# Patient Record
Sex: FEMALE | Race: WHITE | HISPANIC OR LATINO | Employment: PART TIME | URBAN - METROPOLITAN AREA
[De-identification: names, ages, dates, MRNs, and addresses within clinical notes are randomized per-mention and may not be internally consistent; named-entity substitution may affect disease eponyms.]

---

## 2017-02-11 ENCOUNTER — OFFICE VISIT (OUTPATIENT)
Dept: RETAIL CLINIC | Facility: CLINIC | Age: 47
End: 2017-02-11

## 2017-02-11 VITALS
SYSTOLIC BLOOD PRESSURE: 118 MMHG | HEART RATE: 90 BPM | OXYGEN SATURATION: 98 % | DIASTOLIC BLOOD PRESSURE: 68 MMHG | RESPIRATION RATE: 16 BRPM | TEMPERATURE: 98.4 F

## 2017-02-11 DIAGNOSIS — J01.00 ACUTE MAXILLARY SINUSITIS, RECURRENCE NOT SPECIFIED: Primary | ICD-10-CM

## 2017-02-11 PROBLEM — R68.84 JAW PAIN: Status: ACTIVE | Noted: 2017-02-11

## 2017-02-11 PROBLEM — H92.01 RIGHT EAR PAIN: Status: ACTIVE | Noted: 2017-02-11

## 2017-02-11 PROBLEM — J34.89 SINUS PRESSURE: Status: ACTIVE | Noted: 2017-02-11

## 2017-02-11 PROCEDURE — 99213 OFFICE O/P EST LOW 20 MIN: CPT | Performed by: NURSE PRACTITIONER

## 2017-02-11 RX ORDER — AMOXICILLIN AND CLAVULANATE POTASSIUM 875; 125 MG/1; MG/1
1 TABLET, FILM COATED ORAL 2 TIMES DAILY
Qty: 20 TABLET | Refills: 0 | Status: SHIPPED | OUTPATIENT
Start: 2017-02-11 | End: 2017-02-21

## 2017-02-11 NOTE — PROGRESS NOTES
Subjective   Thea Pope is a 46 y.o. female.     Earache    There is pain in the right ear. This is a new problem. The current episode started yesterday. The problem occurs constantly. The problem has been gradually worsening. There has been no fever. The pain is at a severity of 5/10. The pain is moderate. Associated symptoms include coughing, headaches, rhinorrhea and a sore throat. Pertinent negatives include no abdominal pain, diarrhea, neck pain, rash or vomiting. She has tried NSAIDs and acetaminophen for the symptoms. The treatment provided mild relief.   Sinus Problem   This is a new problem. The current episode started 1 to 4 weeks ago. The problem has been gradually worsening since onset. There has been no fever. Her pain is at a severity of 6/10. The pain is moderate. Associated symptoms include congestion, coughing, ear pain, headaches, sinus pressure, sneezing and a sore throat. Pertinent negatives include no neck pain. Past treatments include oral decongestants, saline sprays and acetaminophen. The treatment provided mild relief.       The following portions of the patient's history were reviewed and updated as appropriate: allergies, current medications, past family history, past medical history, past social history, past surgical history and problem list.    Review of Systems   Constitutional: Positive for activity change, appetite change and fatigue.   HENT: Positive for congestion, ear pain, rhinorrhea, sinus pressure, sneezing and sore throat.    Eyes: Negative for discharge and itching.   Respiratory: Positive for cough. Negative for chest tightness and wheezing.    Cardiovascular: Negative for chest pain and palpitations.   Gastrointestinal: Positive for nausea. Negative for abdominal distention, abdominal pain, constipation, diarrhea and vomiting.   Endocrine: Negative for cold intolerance.   Genitourinary: Negative for difficulty urinating.   Musculoskeletal: Negative for gait problem, neck  pain and neck stiffness.   Skin: Negative for color change, pallor and rash.   Allergic/Immunologic: Positive for environmental allergies.   Neurological: Positive for dizziness and headaches.   Psychiatric/Behavioral: Negative for agitation, behavioral problems and confusion.   All other systems reviewed and are negative.      Objective   Physical Exam   Constitutional: She is oriented to person, place, and time. Vital signs are normal. She appears well-developed and well-nourished.   HENT:   Head: Normocephalic.   Right Ear: External ear and ear canal normal. There is tenderness. No drainage. No foreign bodies. Tympanic membrane is erythematous and retracted. Tympanic membrane mobility is abnormal. Decreased hearing is noted.   Left Ear: Hearing, tympanic membrane, external ear and ear canal normal.   Nose: Rhinorrhea and sinus tenderness present. Right sinus exhibits maxillary sinus tenderness and frontal sinus tenderness. Left sinus exhibits maxillary sinus tenderness and frontal sinus tenderness.   Mouth/Throat: Uvula is midline and mucous membranes are normal. Posterior oropharyngeal erythema present.   Purulent discharge noted   Eyes: Conjunctivae, EOM and lids are normal. Pupils are equal, round, and reactive to light.   Neck: Trachea normal and full passive range of motion without pain.   Cardiovascular: Normal rate, regular rhythm and normal heart sounds.    Pulmonary/Chest: Effort normal and breath sounds normal.   Abdominal: Soft. Normal appearance and bowel sounds are normal. There is no tenderness.   Musculoskeletal: Normal range of motion.   Lymphadenopathy:     She has no cervical adenopathy.   Neurological: She is alert and oriented to person, place, and time. She has normal strength.   Skin: Skin is warm, dry and intact. No rash noted.   Psychiatric: She has a normal mood and affect. Her speech is normal and behavior is normal. Judgment and thought content normal. Cognition and memory are normal.        Assessment/Plan   Thea was seen today for earache and sinus problem.    Diagnoses and all orders for this visit:    Acute maxillary sinusitis, recurrence not specified  -     amoxicillin-clavulanate (AUGMENTIN) 875-125 MG per tablet; Take 1 tablet by mouth 2 (Two) Times a Day for 10 days.     Patient agrees with treatment plan and understands when to return to PCP or seek ER care.  Patient states not experiencing fever with illnesses; however, states experiencing chills twice lately during the past few weeks of symptoms.

## 2017-02-11 NOTE — PATIENT INSTRUCTIONS

## 2017-10-06 ENCOUNTER — LAB (OUTPATIENT)
Dept: OBSTETRICS AND GYNECOLOGY | Facility: CLINIC | Age: 47
End: 2017-10-06

## 2017-10-06 DIAGNOSIS — D50.9 IRON DEFICIENCY ANEMIA, UNSPECIFIED IRON DEFICIENCY ANEMIA TYPE: Primary | ICD-10-CM

## 2017-10-06 LAB
25(OH)D3+25(OH)D2 SERPL-MCNC: 16.1 NG/ML
BASOPHILS # BLD AUTO: 0.04 10*3/MM3 (ref 0–0.2)
BASOPHILS NFR BLD AUTO: 0.6 % (ref 0–2)
CALCIUM SERPL-MCNC: 8.8 MG/DL (ref 8.6–10.5)
EOSINOPHIL # BLD AUTO: 0.07 10*3/MM3 (ref 0.1–0.3)
EOSINOPHIL NFR BLD AUTO: 1 % (ref 0–4)
ERYTHROCYTE [DISTWIDTH] IN BLOOD BY AUTOMATED COUNT: 12.7 % (ref 11.5–14.5)
HCT VFR BLD AUTO: 38.3 % (ref 37–47)
HGB BLD-MCNC: 12.7 G/DL (ref 12–16)
IMM GRANULOCYTES # BLD: 0.02 10*3/MM3 (ref 0–0.03)
IMM GRANULOCYTES NFR BLD: 0.3 % (ref 0–0.5)
LYMPHOCYTES # BLD AUTO: 2.06 10*3/MM3 (ref 0.6–4.8)
LYMPHOCYTES NFR BLD AUTO: 29.3 % (ref 20–45)
MCH RBC QN AUTO: 30.1 PG (ref 27–31)
MCHC RBC AUTO-ENTMCNC: 33.2 G/DL (ref 31–37)
MCV RBC AUTO: 90.8 FL (ref 81–99)
MONOCYTES # BLD AUTO: 0.58 10*3/MM3 (ref 0–1)
MONOCYTES NFR BLD AUTO: 8.3 % (ref 3–8)
NEUTROPHILS # BLD AUTO: 4.26 10*3/MM3 (ref 1.5–8.3)
NEUTROPHILS NFR BLD AUTO: 60.5 % (ref 45–70)
NRBC BLD AUTO-RTO: 0 /100 WBC (ref 0–0)
PLATELET # BLD AUTO: 246 10*3/MM3 (ref 140–500)
RBC # BLD AUTO: 4.22 10*6/MM3 (ref 4.2–5.4)
WBC # BLD AUTO: 7.03 10*3/MM3 (ref 4.8–10.8)

## 2017-11-03 ENCOUNTER — TRANSCRIBE ORDERS (OUTPATIENT)
Dept: ADMINISTRATIVE | Facility: HOSPITAL | Age: 47
End: 2017-11-03

## 2017-11-03 DIAGNOSIS — Z12.39 ENCOUNTER FOR SCREENING BREAST EXAMINATION: Primary | ICD-10-CM

## 2017-12-13 ENCOUNTER — HOSPITAL ENCOUNTER (OUTPATIENT)
Dept: MAMMOGRAPHY | Facility: HOSPITAL | Age: 47
Discharge: HOME | End: 2017-12-13
Attending: OBSTETRICS & GYNECOLOGY | Admitting: OBSTETRICS & GYNECOLOGY

## 2017-12-13 DIAGNOSIS — Z12.39 ENCOUNTER FOR SCREENING BREAST EXAMINATION: ICD-10-CM

## 2017-12-13 PROCEDURE — 77063 BREAST TOMOSYNTHESIS BI: CPT

## 2017-12-13 PROCEDURE — G0202 SCR MAMMO BI INCL CAD: HCPCS

## 2017-12-20 ENCOUNTER — OFFICE VISIT (OUTPATIENT)
Dept: OBSTETRICS AND GYNECOLOGY | Facility: CLINIC | Age: 47
End: 2017-12-20

## 2017-12-20 VITALS
DIASTOLIC BLOOD PRESSURE: 70 MMHG | HEIGHT: 63 IN | WEIGHT: 124 LBS | BODY MASS INDEX: 21.97 KG/M2 | SYSTOLIC BLOOD PRESSURE: 110 MMHG

## 2017-12-20 DIAGNOSIS — N95.1 MENOPAUSAL SYMPTOM: ICD-10-CM

## 2017-12-20 DIAGNOSIS — Z01.419 ENCOUNTER FOR GYNECOLOGICAL EXAMINATION WITHOUT ABNORMAL FINDING: ICD-10-CM

## 2017-12-20 DIAGNOSIS — Z13.9 SCREENING FOR CONDITION: Primary | ICD-10-CM

## 2017-12-20 LAB
BILIRUB BLD-MCNC: NEGATIVE MG/DL
CLARITY, POC: CLEAR
COLOR UR: YELLOW
GLUCOSE UR STRIP-MCNC: NEGATIVE MG/DL
KETONES UR QL: NEGATIVE
LEUKOCYTE EST, POC: NEGATIVE
NITRITE UR-MCNC: NEGATIVE MG/ML
PH UR: 5 [PH] (ref 5–8)
PROT UR STRIP-MCNC: NEGATIVE MG/DL
RBC # UR STRIP: NEGATIVE /UL
SP GR UR: 1 (ref 1–1.03)
UROBILINOGEN UR QL: NORMAL

## 2017-12-20 PROCEDURE — 99396 PREV VISIT EST AGE 40-64: CPT | Performed by: OBSTETRICS & GYNECOLOGY

## 2017-12-20 PROCEDURE — 81002 URINALYSIS NONAUTO W/O SCOPE: CPT | Performed by: OBSTETRICS & GYNECOLOGY

## 2017-12-20 PROCEDURE — 99213 OFFICE O/P EST LOW 20 MIN: CPT | Performed by: OBSTETRICS & GYNECOLOGY

## 2017-12-20 RX ORDER — PAROXETINE 7.5 MG/1
1 CAPSULE ORAL
Qty: 30 CAPSULE | Refills: 3 | Status: SHIPPED | OUTPATIENT
Start: 2017-12-20 | End: 2018-12-26

## 2017-12-20 NOTE — PROGRESS NOTES
GYN Annual Exam     CC- Here for annual exam.     Thea Pope is a 47 y.o. female established patient who presents for annual well woman exam. No pain since TLH.  She is having hot flashes and feels very emotionally labile.  She is also having poor sleep.  No pain with sex.     OB History      Para Term  AB Living    2 2 2   2    SAB TAB Ectopic Multiple Live Births                Obstetric Comments    2 C/S          Menarche: 10  Current contraception: status post hysterectomy  History of abnormal Pap smear: no  History of abnormal mammogram: no  Family history of uterine, colon or ovarian cancer: no  Family history of breast cancer: no  H/o STDs: none  Gardasil:     Health Maintenance   Topic Date Due   • TDAP/TD VACCINES (1 - Tdap) 10/27/1987   • INFLUENZA VACCINE  2017       Past Medical History:   Diagnosis Date   • Allergic     seasonal       Past Surgical History:   Procedure Laterality Date   •  SECTION      x 2   • HYSTERECTOMY      TLH with OC   • PELVIC LAPAROSCOPY  2014    dx lsc, removal of adhesion from cuff         Current Outpatient Prescriptions:   •  PARoxetine Mesylate (BRISDELLE) 7.5 MG capsule, Take 1 tablet by mouth every night at bedtime., Disp: 30 capsule, Rfl: 3    No Known Allergies    Social History   Substance Use Topics   • Smoking status: Never Smoker   • Smokeless tobacco: Never Used   • Alcohol use No       Family History   Problem Relation Age of Onset   • No Known Problems Mother    • No Known Problems Father    • Breast cancer Neg Hx    • Ovarian cancer Neg Hx    • Colon cancer Neg Hx    • Deep vein thrombosis Neg Hx    • Pulmonary embolism Neg Hx        Review of Systems   Constitutional: Negative for appetite change, fatigue, fever and unexpected weight change.   Respiratory: Negative for cough and shortness of breath.    Cardiovascular: Negative for chest pain and palpitations.   Gastrointestinal: Negative for abdominal distention,  "abdominal pain, constipation, diarrhea and nausea.   Endocrine: Positive for heat intolerance.   Genitourinary: Negative for dyspareunia, dysuria, menstrual problem, pelvic pain and vaginal discharge.   Skin: Negative for color change and rash.   Neurological: Negative for headaches.   Psychiatric/Behavioral: Positive for decreased concentration, dysphoric mood and sleep disturbance. The patient is nervous/anxious.        /70  Ht 160 cm (63\")  Wt 56.2 kg (124 lb)  BMI 21.97 kg/m2    Physical Exam   Constitutional: She is oriented to person, place, and time. She appears well-developed and well-nourished.   HENT:   Head: Normocephalic and atraumatic.   Neck: Neck supple. No thyromegaly present.   Cardiovascular: Normal rate, regular rhythm and normal heart sounds.    Pulmonary/Chest: Effort normal and breath sounds normal. Right breast exhibits no inverted nipple, no mass, no nipple discharge, no skin change and no tenderness. Left breast exhibits no inverted nipple, no mass, no nipple discharge, no skin change and no tenderness.   Abdominal: Soft. Bowel sounds are normal. She exhibits no distension and no mass. There is no tenderness. No hernia.   Genitourinary: Pelvic exam was performed with patient supine. There is no rash, tenderness or lesion on the right labia. There is no rash, tenderness or lesion on the left labia. Right adnexum displays no mass, no tenderness and no fullness. Left adnexum displays no mass, no tenderness and no fullness. No erythema, tenderness or bleeding in the vagina. No foreign body in the vagina. No signs of injury around the vagina. No vaginal discharge found.   Genitourinary Comments: Cuff normal   No masses   Neurological: She is oriented to person, place, and time.   Skin: Skin is warm and dry.   Psychiatric: She has a normal mood and affect. Her behavior is normal. Judgment and thought content normal.   Nursing note and vitals reviewed.         Assessment/Plan    1) GYN HM: " normal pap/HPV 12/2016   SBE demonstrated and encouraged.  2) STD screening: declines Condoms encouraged.  3) Contraception: s/p TLH.  4) Family Planning: no plans, encourage folic acid daily  5) Diet and Exercise discussed  6) Smoking Status: non smoker  7) Social: from [place], .  8) MMG- UTD 12/2017 Birads 1.  9)Follow up prn or 1 year  10) Hot flashes and poor sleep. D/w pt all options for treatment including lifestyle changes, HRT and SSRIs and she would like to try Brisdelle. R/B/A of Brsidelle D/w pt . RTO 4- 6 weeks f/u symptoms.        Thea was seen today for gynecologic exam.    Diagnoses and all orders for this visit:    Screening for condition  -     POC Urinalysis Dipstick    Encounter for gynecological examination without abnormal finding    Menopausal symptom    Other orders  -     PARoxetine Mesylate (BRISDELLE) 7.5 MG capsule; Take 1 tablet by mouth every night at bedtime.          Monica Jones MD  12/24/2017  3:02 PM

## 2017-12-24 PROBLEM — N95.1 MENOPAUSAL SYMPTOM: Status: ACTIVE | Noted: 2017-12-24

## 2018-08-07 ENCOUNTER — TRANSCRIBE ORDERS (OUTPATIENT)
Dept: ADMINISTRATIVE | Facility: HOSPITAL | Age: 48
End: 2018-08-07

## 2018-08-07 ENCOUNTER — HOSPITAL ENCOUNTER (OUTPATIENT)
Dept: GENERAL RADIOLOGY | Facility: HOSPITAL | Age: 48
Discharge: HOME | End: 2018-08-07
Attending: INTERNAL MEDICINE | Admitting: INTERNAL MEDICINE

## 2018-08-07 DIAGNOSIS — M79.641 RIGHT HAND PAIN: Primary | ICD-10-CM

## 2018-08-07 DIAGNOSIS — M79.641 RIGHT HAND PAIN: ICD-10-CM

## 2018-08-07 PROCEDURE — 73130 X-RAY EXAM OF HAND: CPT

## 2018-10-15 ENCOUNTER — TRANSCRIBE ORDERS (OUTPATIENT)
Dept: ADMINISTRATIVE | Facility: HOSPITAL | Age: 48
End: 2018-10-15

## 2018-10-15 DIAGNOSIS — Z12.39 SCREENING BREAST EXAMINATION: Primary | ICD-10-CM

## 2018-12-18 ENCOUNTER — APPOINTMENT (OUTPATIENT)
Dept: MAMMOGRAPHY | Facility: HOSPITAL | Age: 48
End: 2018-12-18
Attending: OBSTETRICS & GYNECOLOGY

## 2018-12-20 ENCOUNTER — HOSPITAL ENCOUNTER (OUTPATIENT)
Dept: MAMMOGRAPHY | Facility: HOSPITAL | Age: 48
Discharge: HOME | End: 2018-12-20
Attending: OBSTETRICS & GYNECOLOGY | Admitting: OBSTETRICS & GYNECOLOGY

## 2018-12-20 DIAGNOSIS — Z12.39 SCREENING BREAST EXAMINATION: ICD-10-CM

## 2018-12-20 PROCEDURE — 77067 SCR MAMMO BI INCL CAD: CPT

## 2018-12-26 ENCOUNTER — OFFICE VISIT (OUTPATIENT)
Dept: OBSTETRICS AND GYNECOLOGY | Facility: CLINIC | Age: 48
End: 2018-12-26

## 2018-12-26 VITALS
SYSTOLIC BLOOD PRESSURE: 112 MMHG | WEIGHT: 135.8 LBS | BODY MASS INDEX: 24.99 KG/M2 | HEIGHT: 62 IN | DIASTOLIC BLOOD PRESSURE: 80 MMHG

## 2018-12-26 DIAGNOSIS — Z01.419 WELL WOMAN EXAM WITH ROUTINE GYNECOLOGICAL EXAM: ICD-10-CM

## 2018-12-26 DIAGNOSIS — Z13.9 SCREENING FOR CONDITION: Primary | ICD-10-CM

## 2018-12-26 PROCEDURE — 81002 URINALYSIS NONAUTO W/O SCOPE: CPT | Performed by: OBSTETRICS & GYNECOLOGY

## 2018-12-26 PROCEDURE — 99396 PREV VISIT EST AGE 40-64: CPT | Performed by: OBSTETRICS & GYNECOLOGY

## 2018-12-26 NOTE — PROGRESS NOTES
GYN Annual Exam     CC- Here for annual exam.     Thea Pope is a 48 y.o. female established patient who presents for annual well woman exam. NO VB or issues. She did not ever start Brsidelle for HF as she did not want to stop drinking wine. She is still having some HF but they are not as severe as they were. She is not interested in any treatment at present      OB History      Para Term  AB Living    2 2 2     2    SAB TAB Ectopic Molar Multiple Live Births                       Obstetric Comments    2 C/S          Menarche:10  Menopause:s/p TLH with OC   HRT:none  Current contraception: status post hysterectomy  History of abnormal Pap smear: no  History of abnormal mammogram: no  Family history of uterine, colon or ovarian cancer: no  Family history of breast cancer: no  STD's: none  Last pap smear:16- normal pap/neg HPV      Health Maintenance   Topic Date Due   • ANNUAL PHYSICAL  10/27/1971   • HEPATITIS A VACCINE ADULT (1 of 2) 10/27/1986   • TDAP/TD VACCINES (1 - Tdap) 10/27/1987   • COLONOSCOPY  10/06/2017   • INFLUENZA VACCINE  2018   • ZOSTER VACCINE (1 of 2) 10/27/2018       Past Medical History:   Diagnosis Date   • Allergic     seasonal       Past Surgical History:   Procedure Laterality Date   •  SECTION      x 2   • HYSTERECTOMY      TLH with OC   • PELVIC LAPAROSCOPY  2014    dx lsc, removal of adhesion from cuff       No current outpatient medications on file.    No Known Allergies    Social History     Tobacco Use   • Smoking status: Never Smoker   • Smokeless tobacco: Never Used   Substance Use Topics   • Alcohol use: Yes     Comment: occassional   • Drug use: No       Family History   Problem Relation Age of Onset   • No Known Problems Mother    • No Known Problems Father    • Breast cancer Neg Hx    • Ovarian cancer Neg Hx    • Colon cancer Neg Hx    • Deep vein thrombosis Neg Hx    • Pulmonary embolism Neg Hx        Review of Systems  "  Constitutional: Negative for appetite change, fatigue, fever and unexpected weight change.   Respiratory: Negative for cough and shortness of breath.    Cardiovascular: Negative for chest pain and palpitations.   Gastrointestinal: Negative for abdominal distention, abdominal pain, constipation, diarrhea and nausea.   Endocrine: Positive for heat intolerance.   Genitourinary: Negative for dyspareunia, dysuria, menstrual problem, pelvic pain, vaginal bleeding, vaginal discharge and vaginal pain.   Skin: Negative for color change and rash.   Neurological: Negative for headaches.   Psychiatric/Behavioral: Negative for dysphoric mood. The patient is not nervous/anxious.        /80   Ht 157.5 cm (62\")   Wt 61.6 kg (135 lb 12.8 oz)   BMI 24.84 kg/m²     Physical Exam   Constitutional: She is oriented to person, place, and time. She appears well-developed and well-nourished.   HENT:   Head: Normocephalic and atraumatic.   Eyes: Conjunctivae are normal. No scleral icterus.   Neck: Neck supple. No thyromegaly present.   Cardiovascular: Normal rate and regular rhythm.   Pulmonary/Chest: Effort normal and breath sounds normal. Right breast exhibits no inverted nipple, no mass, no nipple discharge, no skin change and no tenderness. Left breast exhibits no inverted nipple, no mass, no nipple discharge, no skin change and no tenderness.   Abdominal: Soft. Bowel sounds are normal. She exhibits no distension and no mass. There is no tenderness. There is no rebound and no guarding. No hernia.   Genitourinary: Pelvic exam was performed with patient supine. There is no rash, tenderness or lesion on the right labia. There is no rash, tenderness or lesion on the left labia. Right adnexum displays no mass, no tenderness and no fullness. Left adnexum displays no mass, no tenderness and no fullness. No erythema, tenderness or bleeding in the vagina. No foreign body in the vagina. No signs of injury around the vagina. No vaginal " discharge found.   Genitourinary Comments: Grade 1 cystocele   Neurological: She is alert and oriented to person, place, and time.   Skin: Skin is warm and dry.   Psychiatric: She has a normal mood and affect. Her behavior is normal. Judgment and thought content normal.   Nursing note and vitals reviewed.         Assessment/Plan    1) GYN HM: check pap/HPV   SBE demonstrated and encouraged.  2) STD screening: declines Condoms encouraged.  3) Bone health - Weight bearing exercise, dietary calcium recommendations and vitamin D reviewed.   4) Diet and Exercise discussed  5) Smoking Status: No   6) Social: , 2 kids, no issues  7)MMG:  UTD 12/20/18- B1  8) DEXA- plan baseline age 53  9)C scope-refer.   10) Grade 1 cystocele- pt asymptomatic- info on Kejulia's noted.   11) Schedule fasting WWP.    Follow up prn and 1 year       Thea was seen today for gynecologic exam.    Diagnoses and all orders for this visit:    Screening for condition  -     POC Urinalysis Dipstick    Well woman exam with routine gynecological exam  -     Pap IG, HPV-hr    Other orders  -     Cancel: Pap IG, HPV-hr        Monica Jones MD  12/26/2018  12:39 PM

## 2018-12-28 LAB
CYTOLOGIST CVX/VAG CYTO: NORMAL
CYTOLOGY CVX/VAG DOC THIN PREP: NORMAL
DX ICD CODE: NORMAL
HIV 1 & 2 AB SER-IMP: NORMAL
HPV I/H RISK 1 DNA CVX QL PROBE+SIG AMP: NEGATIVE
OTHER STN SPEC: NORMAL
PATH REPORT.FINAL DX SPEC: NORMAL
PATHOLOGIST CVX/VAG CYTO: NORMAL
STAT OF ADQ CVX/VAG CYTO-IMP: NORMAL

## 2018-12-31 ENCOUNTER — LAB (OUTPATIENT)
Dept: OBSTETRICS AND GYNECOLOGY | Facility: CLINIC | Age: 48
End: 2018-12-31

## 2018-12-31 DIAGNOSIS — Z00.00 WELL WOMAN EXAM WITHOUT GYNECOLOGICAL EXAM: Primary | ICD-10-CM

## 2019-01-01 LAB
25(OH)D3+25(OH)D2 SERPL-MCNC: 35.2 NG/ML (ref 30–100)
ALBUMIN SERPL-MCNC: 4.5 G/DL (ref 3.5–5.5)
ALBUMIN/GLOB SERPL: 2.8 {RATIO} (ref 1.2–2.2)
ALP SERPL-CCNC: 71 IU/L (ref 39–117)
ALT SERPL-CCNC: 15 IU/L (ref 0–32)
AST SERPL-CCNC: 13 IU/L (ref 0–40)
BASOPHILS # BLD AUTO: 0 X10E3/UL (ref 0–0.2)
BASOPHILS NFR BLD AUTO: 0 %
BILIRUB SERPL-MCNC: 0.7 MG/DL (ref 0–1.2)
BUN SERPL-MCNC: 16 MG/DL (ref 6–24)
BUN/CREAT SERPL: 21 (ref 9–23)
CALCIUM SERPL-MCNC: 9.2 MG/DL (ref 8.7–10.2)
CHLORIDE SERPL-SCNC: 106 MMOL/L (ref 96–106)
CHOLEST SERPL-MCNC: 182 MG/DL (ref 100–199)
CO2 SERPL-SCNC: 22 MMOL/L (ref 20–29)
CREAT SERPL-MCNC: 0.75 MG/DL (ref 0.57–1)
EOSINOPHIL # BLD AUTO: 0.1 X10E3/UL (ref 0–0.4)
EOSINOPHIL NFR BLD AUTO: 1 %
ERYTHROCYTE [DISTWIDTH] IN BLOOD BY AUTOMATED COUNT: 13.7 % (ref 12.3–15.4)
GLOBULIN SER CALC-MCNC: 1.6 G/DL (ref 1.5–4.5)
GLUCOSE SERPL-MCNC: 92 MG/DL (ref 65–99)
HCT VFR BLD AUTO: 37 % (ref 34–46.6)
HDLC SERPL-MCNC: 67 MG/DL
HGB BLD-MCNC: 12.2 G/DL (ref 11.1–15.9)
IMM GRANULOCYTES # BLD: 0 X10E3/UL (ref 0–0.1)
IMM GRANULOCYTES NFR BLD: 0 %
LDLC SERPL CALC-MCNC: 101 MG/DL (ref 0–99)
LYMPHOCYTES # BLD AUTO: 2.4 X10E3/UL (ref 0.7–3.1)
LYMPHOCYTES NFR BLD AUTO: 27 %
MCH RBC QN AUTO: 29.9 PG (ref 26.6–33)
MCHC RBC AUTO-ENTMCNC: 33 G/DL (ref 31.5–35.7)
MCV RBC AUTO: 91 FL (ref 79–97)
MONOCYTES # BLD AUTO: 0.7 X10E3/UL (ref 0.1–0.9)
MONOCYTES NFR BLD AUTO: 8 %
NEUTROPHILS # BLD AUTO: 5.5 X10E3/UL (ref 1.4–7)
NEUTROPHILS NFR BLD AUTO: 64 %
PLATELET # BLD AUTO: 261 X10E3/UL (ref 150–379)
POTASSIUM SERPL-SCNC: 4.8 MMOL/L (ref 3.5–5.2)
PROT SERPL-MCNC: 6.1 G/DL (ref 6–8.5)
RBC # BLD AUTO: 4.08 X10E6/UL (ref 3.77–5.28)
SODIUM SERPL-SCNC: 143 MMOL/L (ref 134–144)
TRIGL SERPL-MCNC: 68 MG/DL (ref 0–149)
TSH SERPL DL<=0.005 MIU/L-ACNC: 1.5 UIU/ML (ref 0.45–4.5)
VLDLC SERPL CALC-MCNC: 14 MG/DL (ref 5–40)
WBC # BLD AUTO: 8.7 X10E3/UL (ref 3.4–10.8)

## 2019-06-12 ENCOUNTER — TELEPHONE (OUTPATIENT)
Dept: GASTROENTEROLOGY | Facility: CLINIC | Age: 49
End: 2019-06-12

## 2019-07-05 DIAGNOSIS — Z12.11 SCREENING FOR COLON CANCER: Primary | ICD-10-CM

## 2019-09-30 ENCOUNTER — OUTSIDE FACILITY SERVICE (OUTPATIENT)
Dept: GASTROENTEROLOGY | Facility: CLINIC | Age: 49
End: 2019-09-30

## 2019-09-30 ENCOUNTER — LAB REQUISITION (OUTPATIENT)
Dept: LAB | Facility: HOSPITAL | Age: 49
End: 2019-09-30

## 2019-09-30 DIAGNOSIS — Z12.11 ENCOUNTER FOR SCREENING FOR MALIGNANT NEOPLASM OF COLON: ICD-10-CM

## 2019-09-30 PROCEDURE — 88305 TISSUE EXAM BY PATHOLOGIST: CPT | Performed by: INTERNAL MEDICINE

## 2019-09-30 PROCEDURE — 45380 COLONOSCOPY AND BIOPSY: CPT | Performed by: INTERNAL MEDICINE

## 2019-10-01 LAB
CYTO UR: NORMAL
LAB AP CASE REPORT: NORMAL
LAB AP CLINICAL INFORMATION: NORMAL
PATH REPORT.FINAL DX SPEC: NORMAL
PATH REPORT.GROSS SPEC: NORMAL

## 2019-10-03 ENCOUNTER — TELEPHONE (OUTPATIENT)
Dept: GASTROENTEROLOGY | Facility: CLINIC | Age: 49
End: 2019-10-03

## 2019-10-03 NOTE — TELEPHONE ENCOUNTER
SPOKE WITH PATIENT.  CALLING ON TEST RESULTS FROM HER COLONOSCOPY.  EXPLAINED DR. LEPE NOTE.  REPEAT IN 3 YEARS.  SHE UNDERSTANDS.

## 2019-10-04 ENCOUNTER — TRANSCRIBE ORDERS (OUTPATIENT)
Dept: OBSTETRICS AND GYNECOLOGY | Facility: CLINIC | Age: 49
End: 2019-10-04

## 2019-10-04 DIAGNOSIS — Z12.31 SCREENING MAMMOGRAM, ENCOUNTER FOR: Primary | ICD-10-CM

## 2019-12-23 ENCOUNTER — HOSPITAL ENCOUNTER (OUTPATIENT)
Dept: MAMMOGRAPHY | Facility: HOSPITAL | Age: 49
Discharge: HOME | End: 2019-12-23
Admitting: OBSTETRICS & GYNECOLOGY

## 2019-12-23 DIAGNOSIS — Z12.31 SCREENING MAMMOGRAM, ENCOUNTER FOR: ICD-10-CM

## 2019-12-23 DIAGNOSIS — R92.8 ABNORMAL MAMMOGRAM: Primary | ICD-10-CM

## 2019-12-23 PROCEDURE — 77063 BREAST TOMOSYNTHESIS BI: CPT

## 2019-12-23 PROCEDURE — 77067 SCR MAMMO BI INCL CAD: CPT

## 2019-12-30 ENCOUNTER — HOSPITAL ENCOUNTER (OUTPATIENT)
Dept: MAMMOGRAPHY | Facility: HOSPITAL | Age: 49
Discharge: HOME | End: 2019-12-30
Admitting: OBSTETRICS & GYNECOLOGY

## 2019-12-30 ENCOUNTER — APPOINTMENT (OUTPATIENT)
Dept: ULTRASOUND IMAGING | Facility: HOSPITAL | Age: 49
End: 2019-12-30

## 2019-12-30 DIAGNOSIS — R92.8 ABNORMAL MAMMOGRAM: ICD-10-CM

## 2019-12-30 PROCEDURE — G0279 TOMOSYNTHESIS, MAMMO: HCPCS

## 2019-12-30 PROCEDURE — 77065 DX MAMMO INCL CAD UNI: CPT

## 2020-01-08 ENCOUNTER — OFFICE VISIT (OUTPATIENT)
Dept: OBSTETRICS AND GYNECOLOGY | Facility: CLINIC | Age: 50
End: 2020-01-08

## 2020-01-08 VITALS
HEIGHT: 63 IN | WEIGHT: 138 LBS | BODY MASS INDEX: 24.45 KG/M2 | DIASTOLIC BLOOD PRESSURE: 70 MMHG | SYSTOLIC BLOOD PRESSURE: 110 MMHG

## 2020-01-08 DIAGNOSIS — Z01.419 WELL WOMAN EXAM: Primary | ICD-10-CM

## 2020-01-08 DIAGNOSIS — Z01.419 PAP SMEAR, LOW-RISK: ICD-10-CM

## 2020-01-08 DIAGNOSIS — Z11.51 ENCOUNTER FOR SCREENING FOR HUMAN PAPILLOMAVIRUS (HPV): ICD-10-CM

## 2020-01-08 DIAGNOSIS — N95.1 MENOPAUSAL SYMPTOM: ICD-10-CM

## 2020-01-08 PROCEDURE — 99396 PREV VISIT EST AGE 40-64: CPT | Performed by: OBSTETRICS & GYNECOLOGY

## 2020-01-08 PROCEDURE — 81002 URINALYSIS NONAUTO W/O SCOPE: CPT | Performed by: OBSTETRICS & GYNECOLOGY

## 2020-01-08 NOTE — PROGRESS NOTES
GYN Annual Exam     CC- Here for annual exam.     Thea Pope is a 49 y.o. female established patient who presents for annual well woman exam. NO VB or issues. Had a rash on her L breast and saw derm and had a biopsy was normal. Still with some HF, does not want Brisdelle yet.    OB History        2    Para   2    Term   2            AB        Living   2       SAB        TAB        Ectopic        Molar        Multiple        Live Births              Obstetric Comments   2 C/S             Menarche:10  Menopause:s/p TLH with OC   HRT:none  Current contraception: status post hysterectomy  History of abnormal Pap smear: no  History of abnormal mammogram: no  Family history of uterine, colon or ovarian cancer: no  Family history of breast cancer: no  STD's: none  Last pap smear:2018- normal pap/neg HPV  MOOKIE: none      Health Maintenance   Topic Date Due   • ANNUAL PHYSICAL  10/27/1971   • TDAP/TD VACCINES (1 - Tdap) 10/27/1979   • ZOSTER VACCINE (1 of 2) 10/27/2018   • Annual Gynecologic Pelvic and Breast Exam  2019   • MAMMOGRAM  2021   • COLONOSCOPY  2022   • INFLUENZA VACCINE  Completed       Past Medical History:   Diagnosis Date   • Allergic     seasonal   • Colon polyp        Past Surgical History:   Procedure Laterality Date   •  SECTION      x 2   • COLONOSCOPY     • HYSTERECTOMY      TLH with OC   • PELVIC LAPAROSCOPY  2014    dx lsc, removal of adhesion from cuff       No current outpatient medications on file.    No Known Allergies    Social History     Tobacco Use   • Smoking status: Never Smoker   • Smokeless tobacco: Never Used   Substance Use Topics   • Alcohol use: Yes     Comment: occassional   • Drug use: No       Family History   Problem Relation Age of Onset   • No Known Problems Mother    • No Known Problems Father    • Breast cancer Neg Hx    • Ovarian cancer Neg Hx    • Colon cancer Neg Hx    • Deep vein thrombosis Neg Hx    • Pulmonary  "embolism Neg Hx        Review of Systems   Constitutional: Negative for appetite change, fatigue, fever and unexpected weight change.   Respiratory: Negative for cough and shortness of breath.    Cardiovascular: Negative for chest pain and palpitations.   Gastrointestinal: Negative for abdominal distention, abdominal pain, constipation, diarrhea and nausea.   Endocrine: Positive for heat intolerance.   Genitourinary: Negative for dyspareunia, dysuria, menstrual problem, pelvic pain, vaginal bleeding, vaginal discharge and vaginal pain.   Skin: Negative for color change and rash.   Neurological: Negative for headaches.   Psychiatric/Behavioral: Positive for sleep disturbance. Negative for dysphoric mood. The patient is not nervous/anxious.        /70   Ht 160 cm (63\")   Wt 62.6 kg (138 lb)   Breastfeeding No   BMI 24.45 kg/m²     Physical Exam   Constitutional: She is oriented to person, place, and time. She appears well-developed and well-nourished.   HENT:   Head: Normocephalic and atraumatic.   Eyes: Conjunctivae are normal. No scleral icterus.   Neck: Neck supple. No thyromegaly present.   Cardiovascular: Normal rate and regular rhythm.   Pulmonary/Chest: Effort normal and breath sounds normal. Right breast exhibits no inverted nipple, no mass, no nipple discharge, no skin change and no tenderness. Left breast exhibits no inverted nipple, no mass, no nipple discharge, no skin change and no tenderness.   Abdominal: Soft. Bowel sounds are normal. She exhibits no distension and no mass. There is no tenderness. There is no rebound and no guarding. No hernia.   Genitourinary: Pelvic exam was performed with patient supine. There is no rash, tenderness or lesion on the right labia. There is no rash, tenderness or lesion on the left labia. Right adnexum displays no mass, no tenderness and no fullness. Left adnexum displays no mass, no tenderness and no fullness. No erythema, tenderness or bleeding in the " vagina. No foreign body in the vagina. No signs of injury around the vagina. No vaginal discharge found.   Genitourinary Comments: Grade 1 cystocele   Neurological: She is alert and oriented to person, place, and time.   Skin: Skin is warm and dry.   Psychiatric: She has a normal mood and affect. Her behavior is normal. Judgment and thought content normal.   Nursing note and vitals reviewed.         Assessment/Plan    1) GYN HM: check pap/HPV   SBE demonstrated and encouraged.  2) STD screening: declines Condoms encouraged.  3) Bone health - Weight bearing exercise, dietary calcium recommendations and vitamin D reviewed.   4) Diet and Exercise discussed  5) Smoking Status: No   6) HF- try OTC like Estroven, decline all other meds  7)MMG:  UTD 12/2019- B1  8) DEXA- plan baseline age 53  9)C scope-UTD 9/2019, repeat 3 years  10) Grade 1 cystocele- pt asymptomatic- info on Kegle's noted.   11) Parts of this document have been copied or forwarded from her previous visits and have been reviewed, updated and edited as indicated. .   12) Follow up prn and 1 year       Thea was seen today for gynecologic exam.    Diagnoses and all orders for this visit:    Well woman exam  -     POC Urinalysis Dipstick  -     Pap IG, HPV-hr    Pap smear, low-risk  -     Pap IG, HPV-hr    Encounter for screening for human papillomavirus (HPV)  -     Pap IG, HPV-hr        Monica Jones MD  1/8/2020  11:13 AM

## 2020-01-10 LAB
CYTOLOGIST CVX/VAG CYTO: NORMAL
CYTOLOGY CVX/VAG DOC CYTO: NORMAL
CYTOLOGY CVX/VAG DOC THIN PREP: NORMAL
DX ICD CODE: NORMAL
HIV 1 & 2 AB SER-IMP: NORMAL
HPV I/H RISK 1 DNA CVX QL PROBE+SIG AMP: NORMAL
HPV I/H RISK 4 DNA CVX QL PROBE+SIG AMP: NEGATIVE
OTHER STN SPEC: NORMAL
STAT OF ADQ CVX/VAG CYTO-IMP: NORMAL

## 2020-12-08 ENCOUNTER — TRANSCRIBE ORDERS (OUTPATIENT)
Dept: ADMINISTRATIVE | Facility: HOSPITAL | Age: 50
End: 2020-12-08

## 2020-12-08 DIAGNOSIS — Z12.31 SCREENING MAMMOGRAM, ENCOUNTER FOR: Primary | ICD-10-CM

## 2021-01-11 ENCOUNTER — OFFICE VISIT (OUTPATIENT)
Dept: INTERNAL MEDICINE | Facility: CLINIC | Age: 51
End: 2021-01-11

## 2021-01-11 VITALS
RESPIRATION RATE: 16 BRPM | BODY MASS INDEX: 22.66 KG/M2 | OXYGEN SATURATION: 98 % | WEIGHT: 141 LBS | HEIGHT: 66 IN | HEART RATE: 87 BPM | DIASTOLIC BLOOD PRESSURE: 76 MMHG | SYSTOLIC BLOOD PRESSURE: 118 MMHG | TEMPERATURE: 96.8 F

## 2021-01-11 DIAGNOSIS — E03.9 ACQUIRED HYPOTHYROIDISM: Primary | ICD-10-CM

## 2021-01-11 DIAGNOSIS — Z83.49: ICD-10-CM

## 2021-01-11 PROBLEM — H92.01 RIGHT EAR PAIN: Status: RESOLVED | Noted: 2017-02-11 | Resolved: 2021-01-11

## 2021-01-11 PROBLEM — E55.9 VITAMIN D DEFICIENCY: Chronic | Status: ACTIVE | Noted: 2021-01-11

## 2021-01-11 PROBLEM — J34.89 SINUS PRESSURE: Status: RESOLVED | Noted: 2017-02-11 | Resolved: 2021-01-11

## 2021-01-11 PROBLEM — R68.84 JAW PAIN: Status: RESOLVED | Noted: 2017-02-11 | Resolved: 2021-01-11

## 2021-01-11 PROBLEM — E53.8 VITAMIN B12 DEFICIENCY: Chronic | Status: ACTIVE | Noted: 2021-01-11

## 2021-01-11 PROCEDURE — 99214 OFFICE O/P EST MOD 30 MIN: CPT | Performed by: INTERNAL MEDICINE

## 2021-01-11 RX ORDER — LEVOTHYROXINE SODIUM 0.07 MG/1
TABLET ORAL
COMMUNITY
Start: 2020-10-30 | End: 2021-01-28 | Stop reason: SDUPTHER

## 2021-01-11 NOTE — PROGRESS NOTES
"Chief Complaint  Thyroid Problem (pt here to discuss referral to Endo)    Subjective          Thea Pope presents to [facility] for discussion about endo referral. Mom was recently diagnosed with acromegaly, interested in seeing endo bc she is worried she is developing concerning symptoms. Ring size has gone up 3 sizes, shoe size has changed 2-3 sizes over past years.     Objective   Vital Signs:     /76   Pulse 87   Temp 96.8 °F (36 °C)   Resp 16   Ht 167.6 cm (66\")   Wt 64 kg (141 lb)   SpO2 98%   BMI 22.76 kg/m²     Physical Exam  Constitutional:       General: She is not in acute distress.     Appearance: Normal appearance.      Comments: No frontal bossing apparent     HENT:      Head: Normocephalic and atraumatic.      Comments: No apparent frontal bossing       Right Ear: External ear normal.      Left Ear: External ear normal.      Nose: Nose normal.      Mouth/Throat:      Pharynx: Oropharynx is clear. No oropharyngeal exudate.   Eyes:      Extraocular Movements: Extraocular movements intact.      Conjunctiva/sclera: Conjunctivae normal.      Pupils: Pupils are equal, round, and reactive to light.   Neck:      Musculoskeletal: Normal range of motion and neck supple.      Thyroid: No thyroid mass or thyromegaly.   Cardiovascular:      Rate and Rhythm: Normal rate and regular rhythm.      Pulses: Normal pulses.      Heart sounds: No murmur.   Pulmonary:      Effort: Pulmonary effort is normal. No respiratory distress.      Breath sounds: Normal breath sounds.   Abdominal:      General: Abdomen is flat. Bowel sounds are normal.   Musculoskeletal:      Comments: Nodules present on several DIP joints of hands and feet, no other notable joint or bone abnormalities   Neurological:      Mental Status: She is alert.          Result Review :   The following data was reviewed by: Estephania Solorzano MD on 01/11/2021:    Labs: TSH in PF from 9/2020      "      Assessment and Plan      1. Concern for Acromegaly  - pt with positive FHx and many similar presenting symptoms including growth of hands and feet with changing ring and shoe size, gloves also becoming tight. Pt was recently diagnosed with hypothyroidism 6/2020 and has been started on thyroid replacement at that time, but could be central in nature and contributing to an overall pituitary abn, but at time of diagnosis the TSH was largely elevated at >30 which would make this less likely. Did have abn colonoscopy with numerous precancerous polyps and a history of insulin resistant state with gestational diabetes, so hard to say if all coincidental or if supportive of above diagnosis. Will get labs today including IGF-1 for first step in diagnostic algorithm and then determine need for endo involvement from there based on results. Pt aware of plan and is awaiting call regarding lab results once available to plan next step.     I spent 30 minutes caring for Thea on this date of service. This time includes time spent by me in the following activities:preparing for the visit, reviewing tests, obtaining and/or reviewing a separately obtained history, performing a medically appropriate examination and/or evaluation , counseling and educating the patient/family/caregiver, ordering medications, tests, or procedures and documenting information in the medical record    Follow Up   Return for after labs have returned.    Patient was given instructions and counseling regarding her condition or for health maintenance advice. Please see specific information pulled into the AVS if appropriate.     Mihcelle Solorzano MD  [facility]  Phone: [phone]  Fax: [phone]

## 2021-01-14 ENCOUNTER — HOSPITAL ENCOUNTER (OUTPATIENT)
Dept: MAMMOGRAPHY | Facility: HOSPITAL | Age: 51
Discharge: HOME | End: 2021-01-14
Admitting: OBSTETRICS & GYNECOLOGY

## 2021-01-14 DIAGNOSIS — Z12.31 SCREENING MAMMOGRAM, ENCOUNTER FOR: ICD-10-CM

## 2021-01-14 PROCEDURE — 77067 SCR MAMMO BI INCL CAD: CPT

## 2021-01-14 PROCEDURE — 77063 BREAST TOMOSYNTHESIS BI: CPT

## 2021-01-19 ENCOUNTER — APPOINTMENT (OUTPATIENT)
Dept: MAMMOGRAPHY | Facility: HOSPITAL | Age: 51
End: 2021-01-19

## 2021-01-19 ENCOUNTER — TELEPHONE (OUTPATIENT)
Dept: INTERNAL MEDICINE | Facility: CLINIC | Age: 51
End: 2021-01-19

## 2021-01-19 LAB
ALBUMIN SERPL-MCNC: 4.3 G/DL (ref 3.5–5.2)
ALBUMIN/GLOB SERPL: 2.2 G/DL
ALP SERPL-CCNC: 89 U/L (ref 39–117)
ALT SERPL-CCNC: 23 U/L (ref 1–33)
AST SERPL-CCNC: 20 U/L (ref 1–32)
BILIRUB SERPL-MCNC: 0.4 MG/DL (ref 0–1.2)
BUN SERPL-MCNC: 13 MG/DL (ref 6–20)
BUN/CREAT SERPL: 21 (ref 7–25)
CALCIUM SERPL-MCNC: 9.2 MG/DL (ref 8.6–10.5)
CHLORIDE SERPL-SCNC: 105 MMOL/L (ref 98–107)
CO2 SERPL-SCNC: 26.7 MMOL/L (ref 22–29)
CREAT SERPL-MCNC: 0.62 MG/DL (ref 0.57–1)
GLOBULIN SER CALC-MCNC: 2 GM/DL
GLUCOSE SERPL-MCNC: 91 MG/DL (ref 65–99)
HBA1C MFR BLD: 5.7 % (ref 4.8–5.6)
IGF-I SERPL-MCNC: NORMAL NG/ML
POTASSIUM SERPL-SCNC: 4.4 MMOL/L (ref 3.5–5.2)
PROT SERPL-MCNC: 6.3 G/DL (ref 6–8.5)
REQUEST PROBLEM: NORMAL
SODIUM SERPL-SCNC: 140 MMOL/L (ref 136–145)
SPECIMEN STATUS: NORMAL
T4 FREE SERPL-MCNC: 1.26 NG/DL (ref 0.93–1.7)
TSH SERPL DL<=0.005 MIU/L-ACNC: 1.23 UIU/ML (ref 0.27–4.2)

## 2021-01-19 NOTE — TELEPHONE ENCOUNTER
"hmmm well that's odd bc you can clearly see in the original order that it is there and is now listed as an \"expected test\", does she have an appt to come back for labs?  "

## 2021-01-19 NOTE — TELEPHONE ENCOUNTER
Caller: Thea Pope    Relationship: Self    Best call back number: [phone]    Caller requesting test results: SELF    What test was performed: LABS    When was the test performed: 1-11-21    Where was the test performed: OFFICE    Additional notes: PATIENT IS NEEDING THE RESULTS FROM HER LABS

## 2021-01-20 ENCOUNTER — TELEPHONE (OUTPATIENT)
Dept: INTERNAL MEDICINE | Facility: CLINIC | Age: 51
End: 2021-01-20

## 2021-01-20 DIAGNOSIS — Z83.49: Primary | ICD-10-CM

## 2021-01-20 NOTE — TELEPHONE ENCOUNTER
Patient called and requested a lab order be put in the system.  She said that she was here on 1/11/21 for lab work, but apparently everything wasn't done.  She's going to get it at [facility] on 1/26/21.    Thank you

## 2021-01-20 NOTE — TELEPHONE ENCOUNTER
Order is in, still not entirely sure what happened last time bc it just says the order was cancelled but it was not, so a new order is placed so she can have that drawn whenever available

## 2021-01-28 DIAGNOSIS — Z83.49: Primary | ICD-10-CM

## 2021-01-28 RX ORDER — LEVOTHYROXINE SODIUM 0.07 MG/1
75 TABLET ORAL DAILY
Qty: 90 TABLET | Refills: 1 | Status: SHIPPED | OUTPATIENT
Start: 2021-01-28 | End: 2021-06-21

## 2021-03-01 ENCOUNTER — OFFICE VISIT (OUTPATIENT)
Dept: OBSTETRICS AND GYNECOLOGY | Facility: CLINIC | Age: 51
End: 2021-03-01

## 2021-03-01 VITALS
HEIGHT: 62 IN | DIASTOLIC BLOOD PRESSURE: 60 MMHG | SYSTOLIC BLOOD PRESSURE: 112 MMHG | BODY MASS INDEX: 26.17 KG/M2 | WEIGHT: 142.2 LBS

## 2021-03-01 DIAGNOSIS — Z01.411 ENCOUNTER FOR GYNECOLOGICAL EXAMINATION WITH ABNORMAL FINDING: ICD-10-CM

## 2021-03-01 DIAGNOSIS — Z13.9 SCREENING FOR CONDITION: Primary | ICD-10-CM

## 2021-03-01 DIAGNOSIS — N95.2 VAGINAL ATROPHY: ICD-10-CM

## 2021-03-01 DIAGNOSIS — N95.1 MENOPAUSAL SYMPTOM: ICD-10-CM

## 2021-03-01 PROCEDURE — 99396 PREV VISIT EST AGE 40-64: CPT | Performed by: OBSTETRICS & GYNECOLOGY

## 2021-03-01 PROCEDURE — 99213 OFFICE O/P EST LOW 20 MIN: CPT | Performed by: OBSTETRICS & GYNECOLOGY

## 2021-03-01 PROCEDURE — 81002 URINALYSIS NONAUTO W/O SCOPE: CPT | Performed by: OBSTETRICS & GYNECOLOGY

## 2021-03-01 RX ORDER — ESTRADIOL 0.1 MG/G
1 CREAM VAGINAL 2 TIMES WEEKLY
Qty: 45 G | Refills: 6 | Status: SHIPPED | OUTPATIENT
Start: 2021-03-01 | End: 2022-11-02

## 2021-03-01 NOTE — PROGRESS NOTES
GYN Annual Exam     CC- Here for annual exam.     Thea Pope is a 50 y.o. female established patient who presents for annual well woman exam. NO VB . She is having some dryness and discomfort with sex. She is also having severe hot flashes. She used Estroven OTC and it worked well until the past 6 months. She is waking up and night and having sweating through her clothes. She has thyroid disease and is on medication. We discussed lifestyle changes, SSRI and HRT to help and she wants to try some lifestyle changes first. She is drinking more now during the pandemic ( usually at least 2 glasses of wine a night, but yesterday she had 2 glasses of wine and 2 beers). I advised her to limit herself to one drink a day and add back Estroven and we will check again in 4-6 weeks to see how her symptoms are doing.     OB History        2    Para   2    Term   2            AB        Living   2       SAB        TAB        Ectopic        Molar        Multiple        Live Births              Obstetric Comments   2 C/S             Menarche:10  Menopause:s/p TLH with OC   HRT:none  Current contraception: status post hysterectomy  History of abnormal Pap smear: no  History of abnormal mammogram: no  Family history of uterine, colon or ovarian cancer: ? MGM colon cancer  Family history of breast cancer: no  STD's: none  Last pap smear:2020 normal pap/neg HPV  MOOKIE: none      Health Maintenance   Topic Date Due   • ANNUAL PHYSICAL  10/27/1973   • HEPATITIS C SCREENING  10/06/2017   • ZOSTER VACCINE (1 of 2) 10/27/2018   • Annual Gynecologic Pelvic and Breast Exam  2021   • COLONOSCOPY  2022   • MAMMOGRAM  2023   • TDAP/TD VACCINES (2 - Td) 2030   • INFLUENZA VACCINE  Completed   • Pneumococcal Vaccine 0-64  Aged Out   • MENINGOCOCCAL VACCINE  Aged Out       Past Medical History:   Diagnosis Date   • Acute upper respiratory infection, unspecified    • Allergic     seasonal   • Candidiasis      OF OTHER UROGENITAL SITES   • Colon polyp    • Cough    • Deficiency of other specified B group vitamins    • Hypothyroidism    • Pain in limb    • Pain in unspecified joint    • Plantar fasciitis    • Unspecified acute conjunctivitis, left eye    • Unspecified menopausal and perimenopausal disorder    • Vitamin B12 deficiency 2021   • Vitamin D deficiency 2021   • Vitamin D deficiency, unspecified        Past Surgical History:   Procedure Laterality Date   •  SECTION      x 2   • COLONOSCOPY      2019, POLYPECTOMY WITH PRECANCEROUS CELLS, REPEAT Q3Y, DUE    • HYSTERECTOMY  2014    TLH with OC   • PELVIC LAPAROSCOPY  2014    dx lsc, removal of adhesion from cuff         Current Outpatient Medications:   •  estradiol (ESTRACE) 0.1 MG/GM vaginal cream, Insert 1 g into the vagina 2 (Two) Times a Week., Disp: 45 g, Rfl: 6  •  levothyroxine (Euthyrox) 75 MCG tablet, Take 1 tablet by mouth Daily., Disp: 90 tablet, Rfl: 1    No Known Allergies    Social History     Tobacco Use   • Smoking status: Never Smoker   • Smokeless tobacco: Never Used   Substance Use Topics   • Alcohol use: Yes     Frequency: 2-4 times a month     Drinks per session: 1 or 2     Binge frequency: Never   • Drug use: No       Family History   Problem Relation Age of Onset   • Diabetes Mother         PREDIABETES   • Acromegaly Mother    • Hyperlipidemia Father    • Colon cancer Maternal Grandmother    • Breast cancer Neg Hx    • Ovarian cancer Neg Hx    • Deep vein thrombosis Neg Hx    • Pulmonary embolism Neg Hx        Review of Systems   Constitutional: Positive for activity change. Negative for appetite change, fatigue, fever and unexpected weight change.   Respiratory: Negative for cough and shortness of breath.    Cardiovascular: Negative for chest pain and palpitations.   Gastrointestinal: Negative for abdominal distention, abdominal pain, constipation, diarrhea and nausea.   Endocrine: Positive for heat intolerance.  "  Genitourinary: Positive for dyspareunia and vaginal pain. Negative for dysuria, menstrual problem, pelvic pain, vaginal bleeding and vaginal discharge.   Skin: Negative for color change and rash.   Neurological: Negative for headaches.   Psychiatric/Behavioral: Positive for sleep disturbance. Negative for dysphoric mood. The patient is not nervous/anxious.        /60   Ht 157.5 cm (62\")   Wt 64.5 kg (142 lb 3.2 oz)   Breastfeeding No   BMI 26.01 kg/m²     Physical Exam   Constitutional: She is oriented to person, place, and time. She appears well-developed.   HENT:   Head: Normocephalic and atraumatic.   Eyes: Conjunctivae are normal. No scleral icterus.   Neck: Neck supple. No thyromegaly present.   Cardiovascular: Normal rate and regular rhythm.   Pulmonary/Chest: Effort normal and breath sounds normal. Right breast exhibits no inverted nipple, no mass, no nipple discharge, no skin change and no tenderness. Left breast exhibits no inverted nipple, no mass, no nipple discharge, no skin change and no tenderness.   Abdominal: Soft. Normal appearance and bowel sounds are normal. She exhibits no distension and no mass. There is no abdominal tenderness. There is no rebound and no guarding. No hernia.   Genitourinary:    Pelvic exam was performed with patient supine.   There is no rash, tenderness or lesion on the right labia. There is no rash, tenderness or lesion on the left labia. Right adnexum displays no mass, no tenderness and no fullness. Left adnexum displays no mass, no tenderness and no fullness.    No vaginal discharge, erythema, tenderness or bleeding.   No erythema, tenderness or bleeding in the vagina.    No foreign body in the vagina.      No signs of injury in the vagina.      Genitourinary Comments: Grade 1 cystocele  Mild to mod atrophy     Neurological: She is alert and oriented to person, place, and time.   Skin: Skin is warm and dry.   Psychiatric: Her behavior is normal. Mood, judgment " and thought content normal.   Nursing note and vitals reviewed.         Assessment/Plan    1) GYN HM: normal  pap/HPV  1/2020.  SBE demonstrated and encouraged.  2) STD screening: declines Condoms encouraged.  3) Bone health - Weight bearing exercise, dietary calcium recommendations and vitamin D reviewed.   4) Diet and Exercise discussed  5) Smoking Status: No   6) Hot flashes and night sweats- will use Estroven and limit alcohol to one drink a day. RTO 4-6 weeks and if symptoms are not better, then we will discuss SSRI liek Brisdelle and/or HRT.   7)MMG:  UTD 1/2021- B1  8) DEXA- plan baseline age 53  9)C scope-UTD 9/2019, repeat 3 years  10) Grade 1 cystocele- pt asymptomatic- Kegel's encrouaged  11) Vaginal atrophy and pain- no LPS. Rx Estrace x2/week. Patient counseled that vaginal estrogen rings, creams and tablets are available and highly effective at treating local vaginal symptoms such as atrophy and vaginal dryness.  Vaginal estrogen does not cause uterine overgrowth and does not require a progestogen to protect the uterus.  Very small amounts of estrogen are absorbed systemically.  For patients with a history of an estrogen dependent cancer such as breast cancer, the decision to use local estrogen for local vaginal symptoms should be made after consultation with her oncologist.  Possible side effects include local irritation or burning and/or vaginal bleeding and should always be reported.    12)I saw the patient with a face mask, gloves and eye protection  The patient herself was masked.  Social distancing was observed as appropriate.  13)Parts of this document have been copied or forwarded from her previous visits and have been reviewed, updated and edited as indicated. .   14) Follow up prn and 1 year       Diagnoses and all orders for this visit:    1. Screening for condition (Primary)  -     POC Urinalysis Dipstick    2. Menopausal symptom    3. Encounter for gynecological examination with abnormal  finding    4. Vaginal atrophy    Other orders  -     estradiol (ESTRACE) 0.1 MG/GM vaginal cream; Insert 1 g into the vagina 2 (Two) Times a Week.  Dispense: 45 g; Refill: 6        Monica Jones MD  3/1/2021  12:33 EST

## 2021-03-29 ENCOUNTER — OFFICE VISIT (OUTPATIENT)
Dept: OBSTETRICS AND GYNECOLOGY | Facility: CLINIC | Age: 51
End: 2021-03-29

## 2021-03-29 VITALS
BODY MASS INDEX: 25.75 KG/M2 | WEIGHT: 139.9 LBS | HEIGHT: 62 IN | DIASTOLIC BLOOD PRESSURE: 74 MMHG | SYSTOLIC BLOOD PRESSURE: 126 MMHG

## 2021-03-29 DIAGNOSIS — N95.1 MENOPAUSAL SYMPTOM: ICD-10-CM

## 2021-03-29 DIAGNOSIS — N95.2 VAGINAL ATROPHY: ICD-10-CM

## 2021-03-29 DIAGNOSIS — Z13.9 SCREENING FOR CONDITION: Primary | ICD-10-CM

## 2021-03-29 PROCEDURE — 81002 URINALYSIS NONAUTO W/O SCOPE: CPT | Performed by: OBSTETRICS & GYNECOLOGY

## 2021-03-29 PROCEDURE — 99213 OFFICE O/P EST LOW 20 MIN: CPT | Performed by: OBSTETRICS & GYNECOLOGY

## 2021-03-29 RX ORDER — ESCITALOPRAM OXALATE 10 MG/1
10 TABLET ORAL DAILY
Qty: 30 TABLET | Refills: 2 | Status: SHIPPED | OUTPATIENT
Start: 2021-03-29 | End: 2021-05-24 | Stop reason: SDUPTHER

## 2021-03-29 NOTE — PROGRESS NOTES
"      Thea Pope is a 50 y.o. patient who presents for follow up of   Chief Complaint   Patient presents with   • Follow-up     HOT FLASHES       51 yo est pt here for f/u lifestyle changes to help improve HF. She started taking Estroven OTC and decreasing her alcohol consumption. She is still having terrible HF so we discussed starting and SSRI vs HRT and she wants to start \"the least risk thing\" so we will try an SSRI. She was given Brsidelle several years ago but never took it . We discussed that Lexapro may be a better option since she does drink on occasion.  She was also started on Estrace cream to help vaginal dryness and she said it has been \"wonderful\" and she is not having dryness or pain now. .       The following portions of the patient's history were reviewed and updated as appropriate: allergies, current medications and problem list.    Review of Systems   Constitutional: Positive for activity change. Negative for appetite change, fatigue, fever and unexpected weight change.   Respiratory: Negative for cough and shortness of breath.    Cardiovascular: Negative for chest pain and palpitations.   Gastrointestinal: Negative for abdominal distention, abdominal pain, constipation, diarrhea and nausea.   Endocrine: Positive for heat intolerance.   Genitourinary: Negative for dyspareunia, dysuria, menstrual problem, pelvic pain, vaginal bleeding, vaginal discharge and vaginal pain.   Skin: Negative for color change and rash.   Neurological: Negative for headaches.   Psychiatric/Behavioral: Positive for sleep disturbance. Negative for dysphoric mood. The patient is not nervous/anxious.        /74   Ht 157.5 cm (62.01\")   Wt 63.5 kg (139 lb 14.4 oz)   Breastfeeding No   BMI 25.58 kg/m²     Physical Exam  Vitals and nursing note reviewed.   Constitutional:       Appearance: She is well-developed.   HENT:      Head: Normocephalic and atraumatic.   Eyes:      General: No scleral icterus.     " Conjunctiva/sclera: Conjunctivae normal.   Neck:      Thyroid: No thyromegaly.   Abdominal:      General: There is no distension.      Palpations: Abdomen is soft. There is no mass.      Tenderness: There is no abdominal tenderness. There is no guarding or rebound.      Hernia: No hernia is present.   Skin:     General: Skin is warm and dry.   Neurological:      Mental Status: She is alert and oriented to person, place, and time.   Psychiatric:         Mood and Affect: Mood normal.         Behavior: Behavior normal.         Thought Content: Thought content normal.         Judgment: Judgment normal.         A/P:  1. HF- Rx for Lexapro 10 mg called in. Cont Estroven  2. Vaginal atrophy- much improved with Estrace cream x2/week.   3. RTO 6 weeks f/u meds for HF.  4. RHM- UTD annual 3/2021, UTD MMG 1/2021    Assessment/Plan   Diagnoses and all orders for this visit:    1. Screening for condition (Primary)  -     POC Urinalysis Dipstick    2. Menopausal symptom    3. Vaginal atrophy    Other orders  -     escitalopram (Lexapro) 10 MG tablet; Take 1 tablet by mouth Daily.  Dispense: 30 tablet; Refill: 2                 No follow-ups on file.      Monica Jones MD    3/29/2021  13:17 EDT

## 2021-05-24 ENCOUNTER — OFFICE VISIT (OUTPATIENT)
Dept: OBSTETRICS AND GYNECOLOGY | Facility: CLINIC | Age: 51
End: 2021-05-24

## 2021-05-24 VITALS
SYSTOLIC BLOOD PRESSURE: 128 MMHG | WEIGHT: 139.4 LBS | DIASTOLIC BLOOD PRESSURE: 72 MMHG | HEIGHT: 62 IN | BODY MASS INDEX: 25.65 KG/M2

## 2021-05-24 DIAGNOSIS — Z79.899: ICD-10-CM

## 2021-05-24 DIAGNOSIS — N95.1 MENOPAUSAL SYMPTOM: Primary | ICD-10-CM

## 2021-05-24 PROCEDURE — 99212 OFFICE O/P EST SF 10 MIN: CPT | Performed by: OBSTETRICS & GYNECOLOGY

## 2021-05-24 RX ORDER — ESCITALOPRAM OXALATE 10 MG/1
TABLET ORAL
Qty: 90 TABLET | Refills: 3 | Status: SHIPPED | OUTPATIENT
Start: 2021-05-24 | End: 2021-06-22

## 2021-05-24 RX ORDER — ESCITALOPRAM OXALATE 10 MG/1
10 TABLET ORAL DAILY
Qty: 90 TABLET | Refills: 3 | Status: SHIPPED | OUTPATIENT
Start: 2021-05-24 | End: 2021-05-24 | Stop reason: SDUPTHER

## 2021-05-24 NOTE — PROGRESS NOTES
"      Thea Pope is a 50 y.o. patient who presents for follow up of   Chief Complaint   Patient presents with   • Follow-up     medication     49 yo est pt here for 2 month f/u of Lexapro 10 mg QD for hot flashes, irritability and night sweats. She was started on vaginal Estrogen for vaginal dryness and it has helped resolve her vaginal symptoms. She says the Lexapro is \"wonderful\" and she feels \"like myself again\" and is motivated to out and do things, is sleeping better and is not having HF. She has not noticed any side effects.         The following portions of the patient's history were reviewed and updated as appropriate: allergies, current medications and problem list.    Review of Systems   Constitutional: Positive for activity change. Negative for unexpected weight change.   Endocrine: Negative for cold intolerance and heat intolerance.   Genitourinary: Negative for vaginal bleeding, vaginal discharge and vaginal pain.   Psychiatric/Behavioral: Negative for decreased concentration, dysphoric mood, hallucinations and sleep disturbance. The patient is not nervous/anxious.    All other systems reviewed and are negative.      /72   Ht 157.5 cm (62.01\")   Wt 63.2 kg (139 lb 6.4 oz)   Breastfeeding No   BMI 25.49 kg/m²     Physical Exam  Vitals and nursing note reviewed.   Constitutional:       Appearance: Normal appearance. She is well-developed.   HENT:      Head: Normocephalic and atraumatic.   Eyes:      General: No scleral icterus.     Conjunctiva/sclera: Conjunctivae normal.   Neck:      Thyroid: No thyromegaly.   Skin:     General: Skin is warm and dry.   Neurological:      Mental Status: She is alert and oriented to person, place, and time.   Psychiatric:         Mood and Affect: Mood normal.         Behavior: Behavior normal.         Thought Content: Thought content normal.         Judgment: Judgment normal.         A/P:  1. Menopausal symptoms- improved on Lexapro 10 mg, no side effects " noted. Will continue and new Rx called in.   2. Vaginal dryness- doing well on Estrace X2/week.   3. RHM- RTO 3/2022 annual or prn.     Assessment/Plan   Diagnoses and all orders for this visit:    1. Menopausal symptom (Primary)    2. Medication satisfactory    Other orders  -     escitalopram (Lexapro) 10 MG tablet; Take 1 tablet by mouth Daily.  Dispense: 90 tablet; Refill: 3                 No follow-ups on file.      Monica Jones MD    5/24/2021  14:38 EDT

## 2021-06-21 ENCOUNTER — OFFICE VISIT (OUTPATIENT)
Dept: ENDOCRINOLOGY | Age: 51
End: 2021-06-21

## 2021-06-21 VITALS
HEART RATE: 90 BPM | OXYGEN SATURATION: 96 % | DIASTOLIC BLOOD PRESSURE: 70 MMHG | WEIGHT: 137.8 LBS | SYSTOLIC BLOOD PRESSURE: 122 MMHG | HEIGHT: 62 IN | BODY MASS INDEX: 25.36 KG/M2

## 2021-06-21 DIAGNOSIS — E03.9 ACQUIRED HYPOTHYROIDISM: Primary | ICD-10-CM

## 2021-06-21 DIAGNOSIS — Z83.49: ICD-10-CM

## 2021-06-21 PROCEDURE — 99204 OFFICE O/P NEW MOD 45 MIN: CPT | Performed by: INTERNAL MEDICINE

## 2021-06-21 RX ORDER — LEVOTHYROXINE SODIUM 75 UG/1
75 TABLET ORAL DAILY
Qty: 30 TABLET | Refills: 11 | Status: SHIPPED | OUTPATIENT
Start: 2021-06-21 | End: 2021-07-13 | Stop reason: SDUPTHER

## 2021-06-21 NOTE — PROGRESS NOTES
"Chief Complaint  Chief Complaint   Patient presents with   • Hypothyroidism   NEW PATIENT / HYPOTHYROIDISM     Subjective          History of Present Illness    Thea Pope 50 y.o. presents as a new patient for the evaluation of Hypothyroidism and acromegaly. Consulted by Dr. Solorzano.     Pt reports that she does have family hx of acromegaly in her mother.   She does reports that there is change in ring size, there is some change in shoe size but reports that this was during her pregnancy.   She gained about 15 pounds in the last 1 year, majority of this weight per pt has been in her breast area, there is breast enlargement per pt and no discharge.   No drastic headache, but once in a while she does have headache. No change in hat size.   She c/o feeling tired, c/o hair loss - on levothyroxine 75 mcg oral daily.   No hx of dm or HTN.     In menopause - 2 years ago.       Reviewed primary care physician's/consulting physician documentation and lab results         I have reviewed the patient's allergies, medicines, past medical hx, family hx and social hx in detail.    Objective   Vital Signs:   /70   Pulse 90   Ht 157.5 cm (62\")   Wt 62.5 kg (137 lb 12.8 oz)   SpO2 96%   BMI 25.20 kg/m²   Physical Exam   General appearance - no distress  Eyes- anicteric sclera  Ear nose and throat-external ears and nose normal.    Respiratory-normal chest on inspection.  No respiratory distress noted.  Skin-no rashes.  Neuro-alert and oriented x3      Result Review :{ Labs  Result Review  Imaging  Med Tab  Media :23}   The following data was reviewed by: Winston Stanley MD on 06/21/2021:  Office Visit on 03/29/2021   Component Date Value Ref Range Status   • Color 03/29/2021 Yellow  Yellow, Straw, Dark Yellow, Cece Final   • Clarity, UA 03/29/2021 Clear  Clear Final   • Glucose, UA 03/29/2021 Negative  Negative, 1000 mg/dL (3+) mg/dL Final   • Bilirubin 03/29/2021 Negative  Negative Final   • Ketones, UA 03/29/2021 " Negative  Negative Final   • Specific Gravity  03/29/2021 1.005  1.005 - 1.030 Final   • Blood, UA 03/29/2021 Negative  Negative Final   • pH, Urine 03/29/2021 5.0  5.0 - 8.0 Final   • Protein, POC 03/29/2021 Negative  Negative mg/dL Final   • Urobilinogen, UA 03/29/2021 Normal  Normal Final   • Leukocytes 03/29/2021 Negative  Negative Final   • Nitrite, UA 03/29/2021 Negative  Negative Final     Data reviewed: referral documentation       Results Review:    I reviewed the patient's new clinical results.     Assessment and Plan    Problem List Items Addressed This Visit        Other    Hypothyroidism - Primary    Relevant Medications    Unithroid 75 MCG tablet    Other Relevant Orders    TSH    T4, Free    T3, Free    Basic Metabolic Panel    ACTH    Cortisol - AM    Cortisol, Free    Growth Hormone    Insulin-like Growth Factor    Prolactin      Other Visit Diagnoses     Family history of acromegaly        Relevant Orders    TSH    T4, Free    T3, Free    Basic Metabolic Panel    ACTH    Cortisol - AM    Cortisol, Free    Growth Hormone    Insulin-like Growth Factor    Prolactin        Hypothyroidism  Change levothyroxine to Unithroid  Would consider Unithroid to see if patient's hair loss improves on it.  Continue at the same dosage of Unithroid-75 mcg oral daily.    Family history of acromegaly  Check thyroid panel,  check ACTH, cortisol levels  Check growth hormone and insulin like growth factors  Check prolactin levels.    If any of these hormones are abnormal might consider with MRI of the pituitary to rule out pituitary tumor.    Explained to the patient that genetic work-up could be expensive for us to check the RET oncogene and might not be covered by the insurance but if any of these hormone levels are abnormal might definitely consider doing the work-up    Interpreted the blood work-up/imaging results performed by the primary care/consulting physician -    Refills sent to pharmacy    Follow Up     Patient  "was given instructions and counseling regarding her condition or for health maintenance advice. Please see specific information pulled into the AVS if appropriate.       Thank you for asking me to see your patient, Thea Pope in consultation.         Winston Stanley MD  06/21/21      EMR Dragon / transcription disclaimer:     \"Dictated utilizing Dragon dictation\".              "

## 2021-06-22 RX ORDER — ESCITALOPRAM OXALATE 10 MG/1
TABLET ORAL
Qty: 30 TABLET | Refills: 6 | Status: SHIPPED | OUTPATIENT
Start: 2021-06-22 | End: 2021-07-14 | Stop reason: SINTOL

## 2021-07-08 NOTE — TELEPHONE ENCOUNTER
Left voice mail to let patient know that we are sending in the tirosint  To her pharmacy        Dr wellington wanted patient to take Unithyroid  Instead  Of levothyroxine   Pharmacy called stating that they do not carry unithyroid and insurance will not cover synthroid   It looks like  Her insurance will cover tirosint  Is it okay to send in  Tirosint

## 2021-07-09 RX ORDER — LEVOTHYROXINE SODIUM 75 UG/1
75 CAPSULE ORAL DAILY
Qty: 90 CAPSULE | Refills: 1 | Status: SHIPPED | OUTPATIENT
Start: 2021-07-09 | End: 2022-03-07

## 2021-07-13 RX ORDER — LEVOTHYROXINE SODIUM 75 UG/1
75 TABLET ORAL DAILY
Qty: 30 TABLET | Refills: 5 | Status: SHIPPED | OUTPATIENT
Start: 2021-07-13 | End: 2022-02-18 | Stop reason: SDUPTHER

## 2021-07-14 ENCOUNTER — TELEPHONE (OUTPATIENT)
Dept: OBSTETRICS AND GYNECOLOGY | Facility: CLINIC | Age: 51
End: 2021-07-14

## 2021-07-14 RX ORDER — PAROXETINE 7.5 MG/1
1 CAPSULE ORAL NIGHTLY
Qty: 30 CAPSULE | Refills: 2 | Status: SHIPPED | OUTPATIENT
Start: 2021-07-14 | End: 2021-07-19

## 2021-07-14 NOTE — TELEPHONE ENCOUNTER
Ok. She can stop her Lexapro and I called in an Rx for Blanca. She will need to see me back in 2-3 months to check up on her symptoms.

## 2021-07-14 NOTE — TELEPHONE ENCOUNTER
PATIENT IS HAVING HEADACHE, DRY MOUTH AND VOMITING OVER THE LAST 4 WEEKS OF TAKING AND WOULD LIKE A CHANGE IN MEDICATION,

## 2021-07-19 ENCOUNTER — TELEPHONE (OUTPATIENT)
Dept: OBSTETRICS AND GYNECOLOGY | Facility: CLINIC | Age: 51
End: 2021-07-19

## 2021-07-19 RX ORDER — PAROXETINE 10 MG/1
10 TABLET, FILM COATED ORAL NIGHTLY
Qty: 30 TABLET | Refills: 2 | Status: SHIPPED | OUTPATIENT
Start: 2021-07-19 | End: 2021-08-16 | Stop reason: ALTCHOICE

## 2021-07-19 NOTE — TELEPHONE ENCOUNTER
Pharmacist called on pt behalf in regards to paroxetine rx that was called in, due to cost pharmacist recommends changing mg to 10 mg (or even suggest taking 1/2 of 10 mg instructions) pharmacist said this will make the rx approx $120 cheaper for the patient, please advise?

## 2021-07-20 ENCOUNTER — TELEPHONE (OUTPATIENT)
Dept: ENDOCRINOLOGY | Age: 51
End: 2021-07-20

## 2021-07-20 DIAGNOSIS — E03.9 ACQUIRED HYPOTHYROIDISM: Primary | ICD-10-CM

## 2021-07-20 NOTE — TELEPHONE ENCOUNTER
Spoke with patient to let her know that I have sent in a referral for her to see nutritionist she voice understanding

## 2021-08-16 ENCOUNTER — TELEPHONE (OUTPATIENT)
Dept: OBSTETRICS AND GYNECOLOGY | Facility: CLINIC | Age: 51
End: 2021-08-16

## 2021-08-16 RX ORDER — ESCITALOPRAM OXALATE 10 MG/1
10 TABLET ORAL DAILY
Qty: 30 TABLET | Refills: 6 | Status: SHIPPED | OUTPATIENT
Start: 2021-08-16 | End: 2022-06-01

## 2021-08-16 NOTE — TELEPHONE ENCOUNTER
Pt called stating the the allergic reaction she was having was not due to the lexapro, it was her thyroid meds, an is asking if she can be put back on it.  Thanks.

## 2021-11-22 ENCOUNTER — TELEPHONE (OUTPATIENT)
Dept: INTERNAL MEDICINE | Facility: CLINIC | Age: 51
End: 2021-11-22

## 2021-11-22 NOTE — TELEPHONE ENCOUNTER
Caller: Thea Pope    Relationship to patient: Self    Best call back number: [phone] (M)    Patient is needing: PATIENT CALLED IN TO SEE HOW MD LANDON MURO FELT ABOUT HER TAKING BLACK NII. PATIENT STATES IT IS AVAILABLE OVER THE COUNTER. PLEASE ADVISE. THANK YOU.

## 2021-11-22 NOTE — TELEPHONE ENCOUNTER
I do not think there is anything she is going to say about that.  I probably would not necessarily take it but I do not think is going to cause harm

## 2021-12-14 ENCOUNTER — TRANSCRIBE ORDERS (OUTPATIENT)
Dept: ADMINISTRATIVE | Facility: HOSPITAL | Age: 51
End: 2021-12-14

## 2021-12-14 DIAGNOSIS — Z12.39 SCREENING BREAST EXAMINATION: Primary | ICD-10-CM

## 2022-01-17 ENCOUNTER — HOSPITAL ENCOUNTER (OUTPATIENT)
Dept: MAMMOGRAPHY | Facility: HOSPITAL | Age: 52
Discharge: HOME | End: 2022-01-17
Admitting: OBSTETRICS & GYNECOLOGY

## 2022-01-17 DIAGNOSIS — Z12.39 SCREENING BREAST EXAMINATION: ICD-10-CM

## 2022-01-17 PROCEDURE — 77063 BREAST TOMOSYNTHESIS BI: CPT

## 2022-01-17 PROCEDURE — 77067 SCR MAMMO BI INCL CAD: CPT

## 2022-02-16 NOTE — TELEPHONE ENCOUNTER
Rx Refill Note  Requested Prescriptions     Pending Prescriptions Disp Refills   • Unithroid 75 MCG tablet [Pharmacy Med Name: UNITHROID 75 MCG TABLET] 30 tablet 5     Sig: TAKE ONE TABLET BY MOUTH DAILY      Last office visit with prescribing clinician: 1/11/2021      Next office visit with prescribing clinician: Visit date not found            Ingrid Grover MA  02/16/22, 13:03 EST

## 2022-02-17 ENCOUNTER — TELEPHONE (OUTPATIENT)
Dept: ENDOCRINOLOGY | Age: 52
End: 2022-02-17

## 2022-02-17 ENCOUNTER — HOSPITAL ENCOUNTER (OUTPATIENT)
Dept: NUTRITION | Facility: HOSPITAL | Age: 52
Discharge: HOME | End: 2022-02-17
Admitting: INTERNAL MEDICINE

## 2022-02-17 PROCEDURE — 97802 MEDICAL NUTRITION INDIV IN: CPT

## 2022-02-17 RX ORDER — LEVOTHYROXINE SODIUM 75 UG/1
TABLET ORAL
Qty: 30 TABLET | Refills: 5 | OUTPATIENT
Start: 2022-02-17

## 2022-02-18 RX ORDER — LEVOTHYROXINE SODIUM 75 UG/1
75 TABLET ORAL DAILY
Qty: 30 TABLET | Refills: 5 | Status: SHIPPED | OUTPATIENT
Start: 2022-02-18 | End: 2022-07-27

## 2022-03-07 ENCOUNTER — OFFICE VISIT (OUTPATIENT)
Dept: ENDOCRINOLOGY | Age: 52
End: 2022-03-07

## 2022-03-07 ENCOUNTER — OFFICE VISIT (OUTPATIENT)
Dept: OBSTETRICS AND GYNECOLOGY | Facility: CLINIC | Age: 52
End: 2022-03-07

## 2022-03-07 VITALS
BODY MASS INDEX: 27.98 KG/M2 | HEIGHT: 61 IN | SYSTOLIC BLOOD PRESSURE: 126 MMHG | WEIGHT: 148.2 LBS | DIASTOLIC BLOOD PRESSURE: 98 MMHG

## 2022-03-07 VITALS
HEIGHT: 61 IN | WEIGHT: 146.2 LBS | HEART RATE: 82 BPM | DIASTOLIC BLOOD PRESSURE: 75 MMHG | BODY MASS INDEX: 27.6 KG/M2 | OXYGEN SATURATION: 97 % | SYSTOLIC BLOOD PRESSURE: 117 MMHG

## 2022-03-07 DIAGNOSIS — Z01.419 ROUTINE GYNECOLOGICAL EXAMINATION: Primary | ICD-10-CM

## 2022-03-07 DIAGNOSIS — Z83.49: ICD-10-CM

## 2022-03-07 DIAGNOSIS — E03.9 ACQUIRED HYPOTHYROIDISM: Primary | ICD-10-CM

## 2022-03-07 DIAGNOSIS — Z12.11 SCREENING FOR COLON CANCER: ICD-10-CM

## 2022-03-07 DIAGNOSIS — Z12.31 ENCOUNTER FOR SCREENING MAMMOGRAM FOR MALIGNANT NEOPLASM OF BREAST: ICD-10-CM

## 2022-03-07 DIAGNOSIS — E66.3 OVERWEIGHT (BMI 25.0-29.9): ICD-10-CM

## 2022-03-07 DIAGNOSIS — Z11.51 ENCOUNTER FOR SCREENING FOR HUMAN PAPILLOMAVIRUS (HPV): ICD-10-CM

## 2022-03-07 DIAGNOSIS — Z01.419 PAP SMEAR, LOW-RISK: ICD-10-CM

## 2022-03-07 LAB
BILIRUB BLD-MCNC: NEGATIVE MG/DL
CLARITY, POC: CLEAR
COLOR UR: YELLOW
GLUCOSE UR STRIP-MCNC: NEGATIVE MG/DL
KETONES UR QL: NEGATIVE
LEUKOCYTE EST, POC: NEGATIVE
NITRITE UR-MCNC: NEGATIVE MG/ML
PH UR: 5 [PH] (ref 5–8)
PROT UR STRIP-MCNC: NEGATIVE MG/DL
RBC # UR STRIP: NEGATIVE /UL
SP GR UR: 1.02 (ref 1–1.03)
UROBILINOGEN UR QL: NORMAL

## 2022-03-07 PROCEDURE — 99396 PREV VISIT EST AGE 40-64: CPT | Performed by: OBSTETRICS & GYNECOLOGY

## 2022-03-07 PROCEDURE — 99214 OFFICE O/P EST MOD 30 MIN: CPT | Performed by: INTERNAL MEDICINE

## 2022-03-07 PROCEDURE — 81002 URINALYSIS NONAUTO W/O SCOPE: CPT | Performed by: OBSTETRICS & GYNECOLOGY

## 2022-03-07 NOTE — PROGRESS NOTES
"Chief Complaint  Hypothyroidism    Subjective            History of Present Illness   Thea Pope,51 y.o. is here as a F/u for hypothyroidism.     Pt reports that she does have family hx of acromegaly in her mother.  During last visit checked growth hormone, IGF-I levels which were noted to be within normal limits.    Today in clinic patient reports that she is on Unithroid 100 mcg oral daily.  Taking the medication on empty stomach and is compliant with the medication.  Energy levels are okay, weight has been stable.     In menopause - 2 years ago.          Reviewed primary care physician's/consulting physician documentation and lab results     I have reviewed the patient's allergies, medicines, past medical hx, family hx and social hx in detail.    Objective   Vital Signs:   /75   Pulse 82   Ht 154.9 cm (61\")   Wt 66.3 kg (146 lb 3.2 oz)   SpO2 97%   BMI 27.62 kg/m²     Physical Exam   General appearance - no distress  Eyes- anicteric sclera  Ear nose and throat-external ears and nose normal.    Respiratory-normal chest on inspection.  No respiratory distress noted.  Skin-no rashes.  Neuro-alert and oriented x3            Result Review :   The following data was reviewed by: Winston Stanley MD on 03/07/2022:  Results Encounter on 06/22/2021   Component Date Value Ref Range Status   • TSH 06/22/2021 0.639  0.270 - 4.200 uIU/mL Final   • Free T4 06/22/2021 1.33  0.93 - 1.70 ng/dL Final    Results may be falsely increased if patient taking Biotin.   • T3, Free 06/22/2021 2.7  2.0 - 4.4 pg/mL Final   • Glucose 06/22/2021 95  65 - 99 mg/dL Final   • BUN 06/22/2021 10  6 - 20 mg/dL Final   • Creatinine 06/22/2021 0.60  0.57 - 1.00 mg/dL Final   • eGFR Non African Am 06/22/2021 105  >60 mL/min/1.73 Final    Comment: GFR Normal >60  Chronic Kidney Disease <60  Kidney Failure <15     • eGFR  Am 06/22/2021 127  >60 mL/min/1.73 Final   • BUN/Creatinine Ratio 06/22/2021 16.7  7.0 - 25.0 Final   • Sodium " 06/22/2021 140  136 - 145 mmol/L Final   • Potassium 06/22/2021 4.4  3.5 - 5.2 mmol/L Final   • Chloride 06/22/2021 104  98 - 107 mmol/L Final   • Total CO2 06/22/2021 24.4  22.0 - 29.0 mmol/L Final   • Calcium 06/22/2021 9.0  8.6 - 10.5 mg/dL Final   • ACTH 06/22/2021 41.3  7.2 - 63.3 pg/mL Final    ACTH reference interval for samples collected between 7 and 10 AM.   • Cortisol - AM 06/22/2021 17.1  6.2 - 19.4 ug/dL Final   • Cortisol, Free Dialysis, LCMS 06/22/2021 1.37  ug/dL Final    Comment: These tests were developed and their performance  characteristics determined by [employer]. They have not been  cleared or approved by the Food and Drug Administration.  Reference Range:  8 AM 0.10  - 1.20  4 PM 0.042 - 0.872     • Growth Hormone 06/22/2021 0.1  0.0 - 10.0 ng/mL Final   • Insulin-Like Growth Factor-1 06/22/2021 81  65 - 216 ng/mL Final   • Prolactin 06/22/2021 17.3  4.8 - 23.3 ng/mL Final     Data reviewed: PCP notes        I reviewed the patient's new clinical results and mentioned them above in HPI and in plan as well.          Assessment and Plan    Problem List Items Addressed This Visit        Other    Hypothyroidism - Primary    Relevant Orders    TSH    T3, Free    T4, Free    Thyroid Peroxidase Antibody    Basic Metabolic Panel    Hemoglobin A1c    Vitamin D 25 Hydroxy    Vitamin B12 & Folate    TSH    T4, Free    T3, Free    Basic Metabolic Panel    Prolactin    ACTH    Cortisol, Free    Cortisol - AM    Growth Hormone    Insulin-like Growth Factor      Other Visit Diagnoses     Overweight (BMI 25.0-29.9)        Relevant Orders    TSH    T3, Free    T4, Free    Thyroid Peroxidase Antibody    Basic Metabolic Panel    Hemoglobin A1c    Vitamin D 25 Hydroxy    Vitamin B12 & Folate    TSH    T4, Free    T3, Free    Basic Metabolic Panel    Prolactin    ACTH    Cortisol, Free    Cortisol - AM    Growth Hormone    Insulin-like Growth Factor    Family history of acromegaly        Relevant Orders    TSH     T3, Free    T4, Free    Thyroid Peroxidase Antibody    Basic Metabolic Panel    Hemoglobin A1c    Vitamin D 25 Hydroxy    Vitamin B12 & Folate    TSH    T4, Free    T3, Free    Basic Metabolic Panel    Prolactin    ACTH    Cortisol, Free    Cortisol - AM    Growth Hormone    Insulin-like Growth Factor        Hypothyroidism-chronic problem  Continue Unithroid 100 mcg oral daily  Continue to monitor the thyroid levels.    Concern for acromegaly  Continue to monitor IGF-I, growth hormone levels periodically    Check HbA1c    Follow Up   No follow-ups on file.    Refills/Meds sent to pharmacy    Interpreted the blood work-up/imaging results performed by the primary care/consulting physician -    Patient was given instructions and counseling regarding her condition or for health maintenance advice. Please see specific information pulled into the AVS if appropriate.

## 2022-03-07 NOTE — PROGRESS NOTES
"GYN Annual Exam     CC- Here for annual exam.     Thea Pope is a 51 y.o. female established patient who presents for annual well woman exam. NO VB. She loves Lexapro 10 mg and feels much better in terms of HF and sleep. She is having some word recall slowness and we discussed her trying Lexapro 5 mg ( breaking tablet in half) for a month to see if it helps. She tried to go off Lexapro and felt terrible. If she uses estrogen cream twice a week she is \"too humid\" and so is using once a week or every other week and it is working well. She denies  VB.      OB History        2    Para   2    Term   2            AB        Living   2       SAB        IAB        Ectopic        Molar        Multiple        Live Births              Obstetric Comments   2 C/S             Menarche:10  Menopause:s/p TLH with OC   HRT:none  Current contraception: status post hysterectomy  History of abnormal Pap smear: no  History of abnormal mammogram: no  Family history of uterine, colon or ovarian cancer: ? MGM colon cancer  Family history of breast cancer: no  STD's: none  Last pap smear:2020 normal pap/neg HPV  MOOKIE: none      Health Maintenance   Topic Date Due   • ANNUAL PHYSICAL  Never done   • HEPATITIS C SCREENING  Never done   • ZOSTER VACCINE (1 of 2) Never done   • INFLUENZA VACCINE  2021   • Annual Gynecologic Pelvic and Breast Exam  2022   • COVID-19 Vaccine (3 - Booster) 2022   • COLORECTAL CANCER SCREENING  2022   • MAMMOGRAM  2024   • TDAP/TD VACCINES (2 - Td or Tdap) 2030   • Pneumococcal Vaccine 0-64  Aged Out       Past Medical History:   Diagnosis Date   • Acute upper respiratory infection, unspecified    • Allergic     seasonal   • Candidiasis     OF OTHER UROGENITAL SITES   • Colon polyp    • Cough    • Deficiency of other specified B group vitamins    • Hypothyroidism    • Pain in limb    • Pain in unspecified joint    • Plantar fasciitis    • Unspecified acute " conjunctivitis, left eye    • Unspecified menopausal and perimenopausal disorder    • Vitamin B12 deficiency 2021   • Vitamin D deficiency 2021   • Vitamin D deficiency, unspecified        Past Surgical History:   Procedure Laterality Date   •  SECTION      x 2   • COLONOSCOPY      2019, POLYPECTOMY WITH PRECANCEROUS CELLS, REPEAT Q3Y, DUE    • HYSTERECTOMY  2014    TLH with OC   • PELVIC LAPAROSCOPY  2014    dx lsc, removal of adhesion from cuff         Current Outpatient Medications:   •  escitalopram (Lexapro) 10 MG tablet, Take 1 tablet by mouth Daily., Disp: 30 tablet, Rfl: 6  •  estradiol (ESTRACE) 0.1 MG/GM vaginal cream, Insert 1 g into the vagina 2 (Two) Times a Week., Disp: 45 g, Rfl: 6  •  Unithroid 75 MCG tablet, Take 1 tablet by mouth Daily., Disp: 30 tablet, Rfl: 5    No Known Allergies    Social History     Tobacco Use   • Smoking status: Never Smoker   • Smokeless tobacco: Never Used   Vaping Use   • Vaping Use: Never used   Substance Use Topics   • Alcohol use: Yes   • Drug use: No       Family History   Problem Relation Age of Onset   • Diabetes Mother         PREDIABETES   • Acromegaly Mother    • Hyperlipidemia Father    • Colon cancer Maternal Grandmother    • Breast cancer Neg Hx    • Ovarian cancer Neg Hx    • Deep vein thrombosis Neg Hx    • Pulmonary embolism Neg Hx        Review of Systems   Constitutional: Positive for activity change. Negative for appetite change, fatigue, fever and unexpected weight change.   Respiratory: Negative for cough and shortness of breath.    Cardiovascular: Negative for chest pain and palpitations.   Gastrointestinal: Negative for abdominal distention, abdominal pain, constipation, diarrhea and nausea.   Endocrine: Negative for heat intolerance.   Genitourinary: Negative for dyspareunia, dysuria, menstrual problem, pelvic pain, vaginal bleeding, vaginal discharge and vaginal pain.   Skin: Negative for color change and rash.  "  Neurological: Negative for headaches.   Psychiatric/Behavioral: Negative for dysphoric mood and sleep disturbance. The patient is not nervous/anxious.        /98   Ht 154.9 cm (60.98\")   Wt 67.2 kg (148 lb 3.2 oz)   Breastfeeding No   BMI 28.02 kg/m²     Physical Exam   Constitutional: She is oriented to person, place, and time. She appears well-developed.   HENT:   Head: Normocephalic and atraumatic.   Eyes: Conjunctivae are normal. No scleral icterus.   Neck: No thyromegaly present.   Cardiovascular: Normal rate and regular rhythm.   Pulmonary/Chest: Effort normal and breath sounds normal. Right breast exhibits no inverted nipple, no mass, no nipple discharge, no skin change and no tenderness. Left breast exhibits no inverted nipple, no mass, no nipple discharge, no skin change and no tenderness.   Abdominal: Soft. Normal appearance and bowel sounds are normal. She exhibits no distension and no mass. There is no abdominal tenderness. There is no rebound and no guarding. No hernia.   Genitourinary:    Rectum normal.      Pelvic exam was performed with patient supine.   There is no rash, tenderness or lesion on the right labia. There is no rash, tenderness or lesion on the left labia. Right adnexum displays no mass, no tenderness and no fullness. Left adnexum displays no mass, no tenderness and no fullness.    No vaginal discharge, erythema, tenderness or bleeding.   No erythema, tenderness or bleeding in the vagina.    No foreign body in the vagina.      No signs of injury in the vagina.      Genitourinary Comments: Grade 1 cystocele  Uterus and cervix absent       Neurological: She is alert and oriented to person, place, and time.   Skin: Skin is warm and dry.   Psychiatric: Her behavior is normal. Mood, judgment and thought content normal.   Nursing note and vitals reviewed.         Assessment/Plan    1) GYN HM: normal  pap/HPV  1/2020. Check pap/HPV  SBE demonstrated and encouraged.  2) STD " screening: declines Condoms encouraged.  3) Bone health - Weight bearing exercise, dietary calcium recommendations and vitamin D reviewed.   4) Diet and Exercise discussed  5) Smoking Status: No   6) Hot flashes and night sweats- doing well on Lexapro 10 mg, may try 5 mg for the next month  7)MMG:  UTD 1/2022- B1, schedule MMG 1/2023  8) DEXA- plan baseline age 53  9)C scope-UTD 9/2019, repeat 3 years. Refer back to Dr Elkins  10) Grade 1 cystocele- pt asymptomatic- Kegel's encrouaged  11) Vaginal atrophy - improved with Estrace. Patient counseled that vaginal estrogen rings, creams and tablets are available and highly effective at treating local vaginal symptoms such as atrophy and vaginal dryness.  Vaginal estrogen does not cause uterine overgrowth and does not require a progestogen to protect the uterus.  Very small amounts of estrogen are absorbed systemically.  For patients with a history of an estrogen dependent cancer such as breast cancer, the decision to use local estrogen for local vaginal symptoms should be made after consultation with her oncologist.  Possible side effects include local irritation or burning and/or vaginal bleeding and should always be reported.    12)I saw the patient with a face mask, gloves and eye protection  The patient herself was masked.  Social distancing was observed as appropriate.  13)Parts of this document have been copied or forwarded from her previous visits and have been reviewed, updated and edited as indicated. .   14) Follow up prn and 1 year annual        Diagnoses and all orders for this visit:    1. Routine gynecological examination (Primary)  -     POC Urinalysis Dipstick    2. Pap smear, low-risk  -     IgP, Aptima HPV    3. Encounter for screening for human papillomavirus (HPV)  -     IgP, Aptima HPV    4. Screening for colon cancer  -     Ambulatory Referral For Screening Colonoscopy    5. Encounter for screening mammogram for malignant neoplasm of breast  -      Mammo Screening Bilateral With CAD; Future        Monica Jamaica Jones MD  3/7/2022  16:07 EST

## 2022-03-08 LAB
25(OH)D3+25(OH)D2 SERPL-MCNC: 37.3 NG/ML (ref 30–100)
BUN SERPL-MCNC: 12 MG/DL (ref 6–24)
BUN/CREAT SERPL: 19 (ref 9–23)
CALCIUM SERPL-MCNC: 9 MG/DL (ref 8.7–10.2)
CHLORIDE SERPL-SCNC: 101 MMOL/L (ref 96–106)
CO2 SERPL-SCNC: 21 MMOL/L (ref 20–29)
CREAT SERPL-MCNC: 0.63 MG/DL (ref 0.57–1)
EGFR GENE MUT ANL BLD/T: 106 ML/MIN/1.73
FOLATE SERPL-MCNC: 9.6 NG/ML
GLUCOSE SERPL-MCNC: 94 MG/DL (ref 65–99)
HBA1C MFR BLD: 5.9 % (ref 4.8–5.6)
POTASSIUM SERPL-SCNC: 4.4 MMOL/L (ref 3.5–5.2)
REPORT: NORMAL
SODIUM SERPL-SCNC: 136 MMOL/L (ref 134–144)
T3FREE SERPL-MCNC: 2.6 PG/ML (ref 2–4.4)
T4 FREE SERPL-MCNC: 1.13 NG/DL (ref 0.82–1.77)
THYROPEROXIDASE AB SERPL-ACNC: <8 IU/ML (ref 0–34)
TSH SERPL DL<=0.005 MIU/L-ACNC: 0.63 UIU/ML (ref 0.45–4.5)
VIT B12 SERPL-MCNC: 455 PG/ML (ref 232–1245)

## 2022-03-10 LAB
CYTOLOGIST CVX/VAG CYTO: NORMAL
CYTOLOGY CVX/VAG DOC CYTO: NORMAL
CYTOLOGY CVX/VAG DOC THIN PREP: NORMAL
DX ICD CODE: NORMAL
HIV 1 & 2 AB SER-IMP: NORMAL
HPV I/H RISK 4 DNA CVX QL PROBE+SIG AMP: NEGATIVE
OTHER STN SPEC: NORMAL
STAT OF ADQ CVX/VAG CYTO-IMP: NORMAL

## 2022-04-11 ENCOUNTER — PREP FOR SURGERY (OUTPATIENT)
Dept: SURGERY | Facility: SURGERY CENTER | Age: 52
End: 2022-04-11

## 2022-04-11 ENCOUNTER — TELEPHONE (OUTPATIENT)
Dept: GASTROENTEROLOGY | Facility: CLINIC | Age: 52
End: 2022-04-11

## 2022-04-11 DIAGNOSIS — Z86.010 PERSONAL HISTORY OF COLONIC POLYPS: Primary | ICD-10-CM

## 2022-04-11 NOTE — TELEPHONE ENCOUNTER
FAST TRACK - 3 YEAR RECALL - LAST COLONOSCOPY 09/30/2019 - PERSONAL HISTORY POLYPS - FAM HX CRC, GRANDMOTHER - SCHEDULE AT [facility].

## 2022-04-13 PROBLEM — Z86.010 PERSONAL HISTORY OF COLONIC POLYPS: Status: ACTIVE | Noted: 2022-04-13

## 2022-04-13 PROBLEM — Z86.0100 PERSONAL HISTORY OF COLONIC POLYPS: Status: ACTIVE | Noted: 2022-04-13

## 2022-04-13 NOTE — TELEPHONE ENCOUNTER
RETURN CALL FROM PATIENT.  SCHEDULED AT [place] ON 10/10/2022 AT 11AM - ARRIVE 10AM.  WILL MAIL INSTRUCTIONS.

## 2022-05-10 ENCOUNTER — TELEPHONE (OUTPATIENT)
Dept: GASTROENTEROLOGY | Facility: CLINIC | Age: 52
End: 2022-05-10

## 2022-05-10 NOTE — TELEPHONE ENCOUNTER
SCHEDULED FOR COLONOSCOPY ON 10/10/2022 AT [place].  DR. LEPE WILL BE OUT OF TONW.  NEED TO RESCHEDULE.    SCHEDULED AT [facility] ON 10/31/2022 AT 11:45AM - ARRIVE 10:30AM.  SHE CORRECTED HER COPY OF INSTRUCTIONS.

## 2022-06-01 RX ORDER — ESCITALOPRAM OXALATE 10 MG/1
TABLET ORAL
Qty: 30 TABLET | Refills: 2 | Status: SHIPPED | OUTPATIENT
Start: 2022-06-01 | End: 2022-12-21 | Stop reason: SDUPTHER

## 2022-07-27 RX ORDER — LEVOTHYROXINE SODIUM 75 UG/1
TABLET ORAL
Qty: 30 TABLET | Refills: 5 | Status: SHIPPED | OUTPATIENT
Start: 2022-07-27 | End: 2022-09-27 | Stop reason: SDUPTHER

## 2022-09-27 ENCOUNTER — OFFICE VISIT (OUTPATIENT)
Dept: ENDOCRINOLOGY | Age: 52
End: 2022-09-27

## 2022-09-27 ENCOUNTER — TELEPHONE (OUTPATIENT)
Dept: ENDOCRINOLOGY | Age: 52
End: 2022-09-27

## 2022-09-27 VITALS
WEIGHT: 152.6 LBS | SYSTOLIC BLOOD PRESSURE: 110 MMHG | OXYGEN SATURATION: 98 % | HEIGHT: 61 IN | DIASTOLIC BLOOD PRESSURE: 70 MMHG | BODY MASS INDEX: 28.81 KG/M2 | TEMPERATURE: 96.4 F | HEART RATE: 86 BPM

## 2022-09-27 DIAGNOSIS — E66.3 OVERWEIGHT (BMI 25.0-29.9): ICD-10-CM

## 2022-09-27 DIAGNOSIS — Z83.49: ICD-10-CM

## 2022-09-27 DIAGNOSIS — E03.9 ACQUIRED HYPOTHYROIDISM: Primary | ICD-10-CM

## 2022-09-27 PROCEDURE — 99214 OFFICE O/P EST MOD 30 MIN: CPT | Performed by: NURSE PRACTITIONER

## 2022-09-27 RX ORDER — LEVOTHYROXINE SODIUM 75 UG/1
75 TABLET ORAL DAILY
Qty: 90 TABLET | Refills: 2 | Status: SHIPPED | OUTPATIENT
Start: 2022-09-27 | End: 2023-04-03 | Stop reason: SDUPTHER

## 2022-09-27 NOTE — PROGRESS NOTES
Please notify patient to arrange a Physical at her PCP to get her bone density screening taken care of!

## 2022-09-27 NOTE — PROGRESS NOTES
I could do the blood work-up on the day of the visit or patient would be needing the following work-up prior to the visit.  Thanks,              TSH   T3, Free   T4, Free   Thyroid Peroxidase Antibody   Basic Metabolic Panel   Hemoglobin A1c   Vitamin D 25 Hydroxy   Vitamin B12 & Folate   TSH   T4, Free   T3, Free   Basic Metabolic Panel   Prolactin   ACTH   Cortisol, Free   Cortisol - AM   Growth Hormone   Insulin-like Growth Factor

## 2022-09-27 NOTE — TELEPHONE ENCOUNTER
Pt called back and elly Spencer told her to call her PCP and schedule a Physical to efrain her bone density screening taken care of

## 2022-09-27 NOTE — PROGRESS NOTES
"Chief Complaint  Hypothyroidism (Patient states energy level has been normal, weight is higher than expected, no family hx of thyroid disease. )    Subjective        Thea Pope presents to [facility]  History of Present Illness     Hypothyroidism     Currently patient is on Unithroid 75 mcg oral daily  Reports compliance  Frustrated with weight gain   In menopause   Requesting Bone density sac    Objective   Vital Signs:  /70   Pulse 86   Temp 96.4 °F (35.8 °C) (Temporal)   Ht 154.9 cm (60.98\")   Wt 69.2 kg (152 lb 9.6 oz)   SpO2 98%   BMI 28.85 kg/m²   Estimated body mass index is 28.85 kg/m² as calculated from the following:    Height as of this encounter: 154.9 cm (60.98\").    Weight as of this encounter: 69.2 kg (152 lb 9.6 oz).          Physical Exam  Vitals reviewed.   Constitutional:       General: She is not in acute distress.  HENT:      Head: Normocephalic and atraumatic.   Cardiovascular:      Rate and Rhythm: Normal rate and regular rhythm.   Pulmonary:      Effort: Pulmonary effort is normal. No respiratory distress.   Musculoskeletal:         General: No signs of injury. Normal range of motion.      Cervical back: Normal range of motion and neck supple.   Skin:     General: Skin is warm and dry.   Neurological:      Mental Status: She is alert and oriented to person, place, and time. Mental status is at baseline.   Psychiatric:         Mood and Affect: Mood normal.         Behavior: Behavior normal.         Thought Content: Thought content normal.         Judgment: Judgment normal.        Result Review :  The following data was reviewed by: TISHA Garcia on 09/27/2022:  Common labs    Common Labs 3/7/22 3/7/22 9/12/22    1142 1142    Glucose 94  115 (A)   BUN 12  17   Creatinine 0.63  0.69   Sodium 136  140   Potassium 4.4  4.5   Chloride 101  102   Calcium 9.0  9.5   Hemoglobin A1C  5.9 (A)    (A) Abnormal value       Comments are available for some " flowsheets but are not being displayed.                     Assessment and Plan   Diagnoses and all orders for this visit:    1. Acquired hypothyroidism (Primary)  -     TSH; Future  -     T4, Free; Future  -     T3, Free; Future  -     Hemoglobin A1c; Future  -     Comprehensive Metabolic Panel; Future  -     Lipid Panel; Future  -     Vitamin D 25 Hydroxy; Future    2. Overweight (BMI 25.0-29.9)  -     TSH; Future  -     T4, Free; Future  -     T3, Free; Future  -     Hemoglobin A1c; Future  -     Comprehensive Metabolic Panel; Future  -     Lipid Panel; Future  -     Vitamin D 25 Hydroxy; Future    3. Family history of acromegaly  -     TSH; Future  -     T4, Free; Future  -     T3, Free; Future  -     Hemoglobin A1c; Future  -     Comprehensive Metabolic Panel; Future  -     Lipid Panel; Future  -     Vitamin D 25 Hydroxy; Future    Other orders  -     Unithroid 75 MCG tablet; Take 1 tablet by mouth Daily.  Dispense: 90 tablet; Refill: 2             Follow Up   Return in about 6 months (around 3/27/2023).     Thyroid labs stable, continue current T4 dosing at 75 mcg daily  We will discuss bone density screening with PCP-patient also sees gynecology  Will forward labs to Dr. Stanley, at baseline all look stable  In terms of weight loss difficulties: Recommended patient to maintain a 7-day food journal and return to me or the recent dietitian she worked with to continue to improve her dietary eating habits  Gave patient the name of Saxenda and Wegovy to check coverage of weight loss medications with insurance  Advised patient to work with an exercise specialist to ensure her exercise routine is appropriate for her age and weight loss needs  Recommend to start with 5 pound weight loss and maintenance    Patient was given instructions and counseling regarding her condition or for health maintenance advice. Please see specific information pulled into the AVS if appropriate.     Johanna Veliz, APRN

## 2022-09-27 NOTE — PATIENT INSTRUCTIONS
Linda Maloney    Call insurance and ask if they cover the above medications and what the cost would be or if they cover any alternatives that would be more affordable

## 2022-10-27 ENCOUNTER — TELEPHONE (OUTPATIENT)
Dept: GASTROENTEROLOGY | Facility: CLINIC | Age: 52
End: 2022-10-27

## 2022-10-28 ENCOUNTER — ANESTHESIA EVENT (OUTPATIENT)
Dept: PERIOP | Facility: HOSPITAL | Age: 52
End: 2022-10-28

## 2022-10-31 ENCOUNTER — ANESTHESIA (OUTPATIENT)
Dept: PERIOP | Facility: HOSPITAL | Age: 52
End: 2022-10-31

## 2022-10-31 ENCOUNTER — HOSPITAL ENCOUNTER (OUTPATIENT)
Facility: HOSPITAL | Age: 52
Setting detail: HOSPITAL OUTPATIENT SURGERY
Discharge: HOME | End: 2022-10-31
Attending: INTERNAL MEDICINE | Admitting: INTERNAL MEDICINE

## 2022-10-31 VITALS
SYSTOLIC BLOOD PRESSURE: 125 MMHG | RESPIRATION RATE: 16 BRPM | HEART RATE: 71 BPM | BODY MASS INDEX: 27.64 KG/M2 | WEIGHT: 146.2 LBS | OXYGEN SATURATION: 95 % | DIASTOLIC BLOOD PRESSURE: 76 MMHG | TEMPERATURE: 98.2 F

## 2022-10-31 DIAGNOSIS — Z86.010 PERSONAL HISTORY OF COLONIC POLYPS: ICD-10-CM

## 2022-10-31 PROCEDURE — 88305 TISSUE EXAM BY PATHOLOGIST: CPT | Performed by: INTERNAL MEDICINE

## 2022-10-31 PROCEDURE — 25010000002 PROPOFOL 10 MG/ML EMULSION: Performed by: NURSE ANESTHETIST, CERTIFIED REGISTERED

## 2022-10-31 PROCEDURE — 45380 COLONOSCOPY AND BIOPSY: CPT | Performed by: INTERNAL MEDICINE

## 2022-10-31 PROCEDURE — 45385 COLONOSCOPY W/LESION REMOVAL: CPT | Performed by: INTERNAL MEDICINE

## 2022-10-31 RX ORDER — SODIUM CHLORIDE, SODIUM LACTATE, POTASSIUM CHLORIDE, CALCIUM CHLORIDE 600; 310; 30; 20 MG/100ML; MG/100ML; MG/100ML; MG/100ML
9 INJECTION, SOLUTION INTRAVENOUS CONTINUOUS PRN
Status: DISCONTINUED | OUTPATIENT
Start: 2022-10-31 | End: 2022-10-31 | Stop reason: HOSPADM

## 2022-10-31 RX ORDER — SODIUM CHLORIDE 9 MG/ML
40 INJECTION, SOLUTION INTRAVENOUS AS NEEDED
Status: DISCONTINUED | OUTPATIENT
Start: 2022-10-31 | End: 2022-10-31 | Stop reason: HOSPADM

## 2022-10-31 RX ORDER — SODIUM CHLORIDE 0.9 % (FLUSH) 0.9 %
10 SYRINGE (ML) INJECTION AS NEEDED
Status: DISCONTINUED | OUTPATIENT
Start: 2022-10-31 | End: 2022-10-31 | Stop reason: HOSPADM

## 2022-10-31 RX ORDER — SODIUM CHLORIDE 0.9 % (FLUSH) 0.9 %
10 SYRINGE (ML) INJECTION EVERY 12 HOURS SCHEDULED
Status: DISCONTINUED | OUTPATIENT
Start: 2022-10-31 | End: 2022-10-31 | Stop reason: HOSPADM

## 2022-10-31 RX ORDER — PROPOFOL 10 MG/ML
VIAL (ML) INTRAVENOUS AS NEEDED
Status: DISCONTINUED | OUTPATIENT
Start: 2022-10-31 | End: 2022-10-31 | Stop reason: SURG

## 2022-10-31 RX ORDER — LIDOCAINE HYDROCHLORIDE 10 MG/ML
0.5 INJECTION, SOLUTION EPIDURAL; INFILTRATION; INTRACAUDAL; PERINEURAL ONCE AS NEEDED
Status: COMPLETED | OUTPATIENT
Start: 2022-10-31 | End: 2022-10-31

## 2022-10-31 RX ADMIN — SODIUM CHLORIDE, POTASSIUM CHLORIDE, SODIUM LACTATE AND CALCIUM CHLORIDE 9 ML/HR: 600; 310; 30; 20 INJECTION, SOLUTION INTRAVENOUS at 10:54

## 2022-10-31 RX ADMIN — LIDOCAINE HYDROCHLORIDE 50 MG: 10 INJECTION, SOLUTION EPIDURAL; INFILTRATION; INTRACAUDAL; PERINEURAL at 11:09

## 2022-10-31 RX ADMIN — PROPOFOL 320 MG: 10 INJECTION, EMULSION INTRAVENOUS at 11:09

## 2022-10-31 NOTE — ANESTHESIA PREPROCEDURE EVALUATION
Anesthesia Evaluation     Patient summary reviewed and Nursing notes reviewed   no history of anesthetic complications:  NPO Solid Status: > 8 hours  NPO Liquid Status: > 8 hours           Airway   Mallampati: II  TM distance: >3 FB  Neck ROM: full  No difficulty expected  Dental      Pulmonary - negative pulmonary ROS    breath sounds clear to auscultation  Cardiovascular - negative cardio ROS  Exercise tolerance: good (4-7 METS)    Rhythm: regular  Rate: normal        Neuro/Psych- negative ROS  GI/Hepatic/Renal/Endo    (+)   diabetes mellitus (boarderline ) gestational, thyroid problem hypothyroidism    Musculoskeletal (-) negative ROS    Abdominal    Substance History - negative use     OB/GYN          Other - negative ROS                       Anesthesia Plan    ASA 2     MAC     intravenous induction     Anesthetic plan, risks, benefits, and alternatives have been provided, discussed and informed consent has been obtained with: patient.    Use of blood products discussed with patient  Consented to blood products.       CODE STATUS:

## 2022-10-31 NOTE — ANESTHESIA POSTPROCEDURE EVALUATION
Patient: Thea Pope    Procedure Summary     Date: 10/31/22 Room / Location: [facility] ENDOSCOPY 1 /  [facility]    Anesthesia Start: 1106 Anesthesia Stop: 1132    Procedure: COLONOSCOPY WITH POLYPECTOMY Diagnosis:       Personal history of colonic polyps      Colon polyp      (Personal history of colonic polyps [Z86.010])    Surgeons: Vlad Elkins MD Provider:     Anesthesia Type: MAC ASA Status: 2          Anesthesia Type: MAC    Vitals  No vitals data found for the desired time range.          Post Anesthesia Care and Evaluation    Patient location during evaluation: bedside  Patient participation: complete - patient participated  Level of consciousness: awake and alert  Pain score: 0  Pain management: adequate    Airway patency: patent  Anesthetic complications: No anesthetic complications  PONV Status: none  Cardiovascular status: acceptable  Respiratory status: acceptable  Hydration status: acceptable  No anesthesia care post op

## 2022-11-01 LAB
LAB AP CASE REPORT: NORMAL
LAB AP CLINICAL INFORMATION: NORMAL
PATH REPORT.FINAL DX SPEC: NORMAL
PATH REPORT.GROSS SPEC: NORMAL

## 2022-11-02 ENCOUNTER — TELEPHONE (OUTPATIENT)
Dept: GASTROENTEROLOGY | Facility: CLINIC | Age: 52
End: 2022-11-02

## 2022-11-02 ENCOUNTER — TELEPHONE (OUTPATIENT)
Dept: INTERNAL MEDICINE | Facility: CLINIC | Age: 52
End: 2022-11-02

## 2022-11-02 RX ORDER — ESTRADIOL 0.1 MG/G
CREAM VAGINAL
Qty: 43 G | Refills: 0 | Status: SHIPPED | OUTPATIENT
Start: 2022-11-02

## 2022-11-02 NOTE — TELEPHONE ENCOUNTER
Caller: Thea Pope    Relationship: Self    Best call back number: [phone]    What orders are you requesting (i.e. lab or imaging): DEXA SCAN    In what timeframe would the patient need to come in: ASAP    Where will you receive your lab/imaging services: [facility]    Additional notes: PLEASE ORDER

## 2022-11-02 NOTE — TELEPHONE ENCOUNTER
Called on her colonoscopy results.  I see them in MyChart, but do not understands.  Could you please explain them to me.    Explained Dr. Elkins has not reviewed them yet.  Once he reviews, will have the nurse to call you.  She understands.

## 2022-11-02 NOTE — TELEPHONE ENCOUNTER
Pt has not been seen here since Jan 2021, due for follow up. Standard DEXA timing is not until 63, there are numerous reasons that people get them done early, but given that we have no seen her in so long she needs to make an appt to see if early screening is appriopriate for her

## 2022-11-08 ENCOUNTER — OFFICE VISIT (OUTPATIENT)
Dept: INTERNAL MEDICINE | Facility: CLINIC | Age: 52
End: 2022-11-08

## 2022-11-08 VITALS
HEART RATE: 94 BPM | SYSTOLIC BLOOD PRESSURE: 116 MMHG | OXYGEN SATURATION: 96 % | TEMPERATURE: 96.5 F | BODY MASS INDEX: 28.32 KG/M2 | WEIGHT: 150 LBS | DIASTOLIC BLOOD PRESSURE: 74 MMHG | HEIGHT: 61 IN | RESPIRATION RATE: 16 BRPM

## 2022-11-08 DIAGNOSIS — Z11.59 ENCOUNTER FOR HEPATITIS C SCREENING TEST FOR LOW RISK PATIENT: ICD-10-CM

## 2022-11-08 DIAGNOSIS — Z78.0 POSTMENOPAUSAL: ICD-10-CM

## 2022-11-08 DIAGNOSIS — Z00.00 ROUTINE GENERAL MEDICAL EXAMINATION AT A HEALTH CARE FACILITY: Primary | ICD-10-CM

## 2022-11-08 PROCEDURE — 99396 PREV VISIT EST AGE 40-64: CPT | Performed by: INTERNAL MEDICINE

## 2022-11-08 RX ORDER — MELOXICAM 7.5 MG/1
TABLET ORAL
COMMUNITY
Start: 2022-11-03

## 2022-11-08 NOTE — PROGRESS NOTES
"Chief Complaint   Patient presents with   • Follow-up       Subjective   History of Present Illness    Thea Pope is a 52 y.o. female here for annual wellness. Pt does have acute issues to discuss today. States overall things are going well. Taking all meds as prescribed without any issues.     She wanted to discuss possible DEXA scan. Endocrinology told her that she needed one after 48 and was going to schedule it, but then stated she needed to talk to her primary care provider first. Upon chart review of her OBGYN visits, it appears that her OBGYN recommended a DEXA scan at age 53. Patient is post-menopausal. She denied fracture since age 48, no history of smoking, no family history of osteoperosis.     The following portions of the patient's history were reviewed and updated as appropriate: allergies, current medications, past family history, past medical history, past social history, past surgical history, and problem list.    Patient Care Team:  Estephania Solorzano MD as PCP - General (Internal Medicine & Pediatrics)  Monica Jones MD as Consulting Physician (Obstetrics and Gynecology)    Review of Systems   Constitutional: Negative for fatigue and fever.   HENT: Negative for congestion and rhinorrhea.    Eyes: Negative for blurred vision and visual disturbance.   Respiratory: Negative for chest tightness and shortness of breath.    Cardiovascular: Negative for chest pain.   Gastrointestinal: Negative for diarrhea and nausea.   Musculoskeletal: Negative for arthralgias.   Allergic/Immunologic: Negative for immunocompromised state.   Neurological: Negative for confusion.   Psychiatric/Behavioral: Negative for depressed mood.       Body mass index is 28.34 kg/m².   Vitals:    11/08/22 0806   BP: 116/74   Pulse: 94   Resp: 16   Temp: 96.5 °F (35.8 °C)   SpO2: 96%   Weight: 68 kg (150 lb)   Height: 154.9 cm (61\")        Objective   Physical Exam  Vitals and nursing note reviewed.   Constitutional:     "   General: She is not in acute distress.     Appearance: Normal appearance.   HENT:      Head: Normocephalic and atraumatic.      Right Ear: Tympanic membrane and ear canal normal.      Left Ear: Tympanic membrane and ear canal normal.      Nose: Nose normal.      Mouth/Throat:      Mouth: Mucous membranes are moist.      Pharynx: Oropharynx is clear.   Eyes:      Extraocular Movements: Extraocular movements intact.      Conjunctiva/sclera: Conjunctivae normal.      Pupils: Pupils are equal, round, and reactive to light.   Cardiovascular:      Rate and Rhythm: Normal rate and regular rhythm.      Heart sounds: Normal heart sounds. No murmur heard.  Pulmonary:      Effort: Pulmonary effort is normal. No respiratory distress.      Breath sounds: Normal breath sounds. No wheezing or rhonchi.   Abdominal:      General: Bowel sounds are normal. There is no distension.      Palpations: Abdomen is soft. There is no mass.      Tenderness: There is no abdominal tenderness.      Comments: no hepatosplenomegaly   Musculoskeletal:         General: No deformity. Normal range of motion.      Cervical back: Normal range of motion and neck supple.   Skin:     General: Skin is warm and dry.      Capillary Refill: Capillary refill takes less than 2 seconds.      Findings: No lesion or rash.   Neurological:      General: No focal deficit present.      Mental Status: She is alert and oriented to person, place, and time.      Cranial Nerves: No cranial nerve deficit.   Psychiatric:         Mood and Affect: Mood normal.         Behavior: Behavior normal.         Judgment: Judgment normal.         Brief Urine Lab Results  (Last result in the past 365 days)      Color   Clarity   Blood   Leuk Est   Nitrite   Protein   CREAT   Urine HCG        03/07/22 1608 Yellow   Clear   Negative   Negative   Negative   Negative                   Current Outpatient Medications:   •  escitalopram (LEXAPRO) 10 MG tablet, Take 1 tablet by mouth once daily,  Disp: 30 tablet, Rfl: 2  •  estradiol (ESTRACE) 0.1 MG/GM vaginal cream, INSERT 1 GRAM VAGINALLY TWICE A WEEK, Disp: 43 g, Rfl: 0  •  meloxicam (MOBIC) 7.5 MG tablet, , Disp: , Rfl:   •  Unithroid 75 MCG tablet, Take 1 tablet by mouth Daily., Disp: 90 tablet, Rfl: 2     Lab Results   Component Value Date    HGBA1C 5.9 (H) 03/07/2022    TSH 1.460 09/12/2022    YNNE74PU 37.3 03/07/2022        Health Maintenance   Topic Date Due   • ZOSTER VACCINE (1 of 2) Never done   • INFLUENZA VACCINE  08/01/2022   • MAMMOGRAM  01/17/2024   • TDAP/TD VACCINES (2 - Td or Tdap) 06/22/2030        Immunization History   Administered Date(s) Administered   • COVID-19 (MODERNA) 1st, 2nd, 3rd Dose Only 03/11/2021, 04/10/2021   • COVID-19 (PFIZER) PURPLE CAP 11/14/2021   • Covid-19 (Pfizer) Gray Cap 06/19/2022   • FluLaval/Fluzone >6mos 10/09/2019, 10/15/2020   • Hep B, Unspecified 12/08/2006, 01/11/2007, 04/20/2007   • Hepatitis A 06/17/2019   • Influenza, Unspecified 10/28/2021   • Tdap 06/22/2020       Assessment & Plan     Annual Wellness  - Labs ordered including CBC, CMP, Fasting lipid panel, HgbA1C, TSH, Vit D and Hep C. Will call with results once available and make follow up plan and med changes as appropriate.   - Cont current medications for chronic medical issues, refills sent as needed today.   - EKG not indicated  - PAP smear up to date and managed by gynecology. Last 3/7/2022 and negative cytology, negative HPV  - Mammo up to date and managed by gynecology. Last 3/7/2022 and normal  - Cscope Last done 10/31/22 and abnormal with  Two 3 to 8 mm polyps in the cecum, removed with a cold snare. Resected and retrieved. One 4 mm polyp in the sigmoid colon, removed with a cold biopsy forceps. Resected and retrieved.  - DEXA due, ordered today  - Lung CA screening not indicated  - Immunizations: Needs flu shot (currently out in the office today), Shingles vaccine, COVID booster. TDaP UTD. Pt to consider Shingrix vaccine but not  interested today.    - Advised behavioral modifications including dietary changes and increased exercise with goal of healthy weight and lifestyle.       Return in about 1 year (around 11/8/2023) for Annual physical.     Susy Jeffers, PGY-3    Patient seen and evaluated with Dr. Jeffers. Pertinent portions of history and exam repeated. Agree with assessment and plan as above. 53 yo F here for annual wellness. Routine screening labs today, PAP and Mammo UTD. Cscope done recently and with  3 year follow up interval recommended. DEXA ordered. Flu shot, bivalent COVID booster, and shingrix vaccine recommended, none given in office today. Cont to follow up witih endo and GYN as recommended. BP in goal range.     Michelle Solorzano MD  [facility] [place] [facility]  Phone: [phone]  Fax: [phone]

## 2022-11-09 LAB
ALBUMIN SERPL-MCNC: 4.2 G/DL (ref 3.5–5.2)
ALBUMIN/GLOB SERPL: 1.9 G/DL
ALP SERPL-CCNC: 105 U/L (ref 39–117)
ALT SERPL-CCNC: 25 U/L (ref 1–33)
AST SERPL-CCNC: 17 U/L (ref 1–32)
BASOPHILS # BLD AUTO: 0.04 10*3/MM3 (ref 0–0.2)
BASOPHILS NFR BLD AUTO: 0.5 % (ref 0–1.5)
BILIRUB SERPL-MCNC: 0.5 MG/DL (ref 0–1.2)
BUN SERPL-MCNC: 12 MG/DL (ref 6–20)
BUN/CREAT SERPL: 19.7 (ref 7–25)
CALCIUM SERPL-MCNC: 9.3 MG/DL (ref 8.6–10.5)
CHLORIDE SERPL-SCNC: 104 MMOL/L (ref 98–107)
CHOLEST SERPL-MCNC: 178 MG/DL (ref 0–200)
CO2 SERPL-SCNC: 28 MMOL/L (ref 22–29)
CREAT SERPL-MCNC: 0.61 MG/DL (ref 0.57–1)
EGFRCR SERPLBLD CKD-EPI 2021: 106.4 ML/MIN/1.73
EOSINOPHIL # BLD AUTO: 0.08 10*3/MM3 (ref 0–0.4)
EOSINOPHIL NFR BLD AUTO: 1.1 % (ref 0.3–6.2)
ERYTHROCYTE [DISTWIDTH] IN BLOOD BY AUTOMATED COUNT: 12.9 % (ref 12.3–15.4)
GLOBULIN SER CALC-MCNC: 2.2 GM/DL
GLUCOSE SERPL-MCNC: 102 MG/DL (ref 65–99)
HBA1C MFR BLD: 6.2 % (ref 4.8–5.6)
HCT VFR BLD AUTO: 37.3 % (ref 34–46.6)
HCV AB S/CO SERPL IA: <0.1 S/CO RATIO (ref 0–0.9)
HDLC SERPL-MCNC: 52 MG/DL (ref 40–60)
HGB BLD-MCNC: 12.2 G/DL (ref 12–15.9)
IMM GRANULOCYTES # BLD AUTO: 0.02 10*3/MM3 (ref 0–0.05)
IMM GRANULOCYTES NFR BLD AUTO: 0.3 % (ref 0–0.5)
LDLC SERPL CALC-MCNC: 109 MG/DL (ref 0–100)
LYMPHOCYTES # BLD AUTO: 1.97 10*3/MM3 (ref 0.7–3.1)
LYMPHOCYTES NFR BLD AUTO: 26.4 % (ref 19.6–45.3)
MCH RBC QN AUTO: 29.3 PG (ref 26.6–33)
MCHC RBC AUTO-ENTMCNC: 32.7 G/DL (ref 31.5–35.7)
MCV RBC AUTO: 89.4 FL (ref 79–97)
MONOCYTES # BLD AUTO: 0.65 10*3/MM3 (ref 0.1–0.9)
MONOCYTES NFR BLD AUTO: 8.7 % (ref 5–12)
NEUTROPHILS # BLD AUTO: 4.71 10*3/MM3 (ref 1.7–7)
NEUTROPHILS NFR BLD AUTO: 63 % (ref 42.7–76)
NRBC BLD AUTO-RTO: 0.1 /100 WBC (ref 0–0.2)
PLATELET # BLD AUTO: 277 10*3/MM3 (ref 140–450)
POTASSIUM SERPL-SCNC: 4.6 MMOL/L (ref 3.5–5.2)
PROT SERPL-MCNC: 6.4 G/DL (ref 6–8.5)
RBC # BLD AUTO: 4.17 10*6/MM3 (ref 3.77–5.28)
SODIUM SERPL-SCNC: 142 MMOL/L (ref 136–145)
TRIGL SERPL-MCNC: 95 MG/DL (ref 0–150)
VLDLC SERPL CALC-MCNC: 17 MG/DL (ref 5–40)
WBC # BLD AUTO: 7.47 10*3/MM3 (ref 3.4–10.8)

## 2022-11-17 ENCOUNTER — HOSPITAL ENCOUNTER (OUTPATIENT)
Dept: BONE DENSITY | Facility: HOSPITAL | Age: 52
Discharge: HOME | End: 2022-11-17
Admitting: INTERNAL MEDICINE

## 2022-11-17 DIAGNOSIS — Z78.0 POSTMENOPAUSAL: ICD-10-CM

## 2022-11-17 PROCEDURE — 77080 DXA BONE DENSITY AXIAL: CPT

## 2022-12-05 ENCOUNTER — TELEPHONE (OUTPATIENT)
Dept: INTERNAL MEDICINE | Facility: CLINIC | Age: 52
End: 2022-12-05

## 2022-12-05 ENCOUNTER — TRANSCRIBE ORDERS (OUTPATIENT)
Dept: ADMINISTRATIVE | Facility: HOSPITAL | Age: 52
End: 2022-12-05

## 2022-12-05 DIAGNOSIS — Z12.31 SCREENING MAMMOGRAM FOR BREAST CANCER: Primary | ICD-10-CM

## 2022-12-05 NOTE — TELEPHONE ENCOUNTER
Unfortunately probably needs an appt for this since it is not something we have talked about before, not opposed to writing this, but if I am going to put my name it as a recommendation it should be something we have at least discussed

## 2022-12-05 NOTE — TELEPHONE ENCOUNTER
Caller: Thea Pope    Relationship: Self    Best call back number: [phone]    What was the call regarding: PATIENT IS REQUESTING A LETTER FROM DR MURO STATING THAT SHE GET BENEFITS FROM [employer]. PATIENT WOULD LIKE THIS LETTER TO GET A DISCOUNT ON THE MESSAGES.    PATIENT WOULD LIKE TO STOP BY TO PICK THIS UP WHEN IT IS READY     PLEASE CALL AND ADVISE     Do you require a callback: YES

## 2022-12-20 ENCOUNTER — TELEPHONE (OUTPATIENT)
Dept: OBSTETRICS AND GYNECOLOGY | Facility: CLINIC | Age: 52
End: 2022-12-20

## 2022-12-20 NOTE — TELEPHONE ENCOUNTER
.    Caller: MEGAN     Relationship: SELF    Best call back number: [phone]    Requested Prescriptions:   Requested Prescriptions      No prescriptions requested or ordered in this encounter    escitalopram (LEXAPRO) 10 MG tablet  Pharmacy where request should be sent:    [facility] [address] [place] [address] PKY - [phone]  - [phone] FX    Additional details provided by patient: PT NEEDS A REFILL SENT TO HER PHARMACY     Does the patient have less than a 3 day supply:  [x] Yes  [] No    Would you like a call back once the refill request has been completed: [x] Yes [] No    If the office needs to give you a call back, can they leave a voicemail: [x] Yes [] No    Bebeto Kumari Rep   12/20/22 10:25 EST

## 2022-12-21 RX ORDER — ESCITALOPRAM OXALATE 10 MG/1
10 TABLET ORAL DAILY
Qty: 90 TABLET | Refills: 1 | Status: SHIPPED | OUTPATIENT
Start: 2022-12-21

## 2023-01-20 ENCOUNTER — APPOINTMENT (OUTPATIENT)
Dept: MAMMOGRAPHY | Facility: HOSPITAL | Age: 53
End: 2023-01-20
Payer: COMMERCIAL

## 2023-01-23 ENCOUNTER — HOSPITAL ENCOUNTER (OUTPATIENT)
Dept: MAMMOGRAPHY | Facility: HOSPITAL | Age: 53
Discharge: HOME | End: 2023-01-23
Admitting: OBSTETRICS & GYNECOLOGY
Payer: COMMERCIAL

## 2023-01-23 DIAGNOSIS — Z12.31 SCREENING MAMMOGRAM FOR BREAST CANCER: ICD-10-CM

## 2023-01-23 PROCEDURE — 77067 SCR MAMMO BI INCL CAD: CPT

## 2023-01-23 PROCEDURE — 77063 BREAST TOMOSYNTHESIS BI: CPT

## 2023-04-03 ENCOUNTER — OFFICE VISIT (OUTPATIENT)
Dept: ENDOCRINOLOGY | Age: 53
End: 2023-04-03
Payer: COMMERCIAL

## 2023-04-03 VITALS
BODY MASS INDEX: 28.55 KG/M2 | WEIGHT: 151.2 LBS | DIASTOLIC BLOOD PRESSURE: 80 MMHG | SYSTOLIC BLOOD PRESSURE: 114 MMHG | HEART RATE: 98 BPM | TEMPERATURE: 97.3 F | OXYGEN SATURATION: 96 % | HEIGHT: 61 IN

## 2023-04-03 DIAGNOSIS — E03.9 ACQUIRED HYPOTHYROIDISM: Primary | ICD-10-CM

## 2023-04-03 PROCEDURE — 99213 OFFICE O/P EST LOW 20 MIN: CPT | Performed by: NURSE PRACTITIONER

## 2023-04-03 RX ORDER — LEVOTHYROXINE SODIUM 75 UG/1
75 TABLET ORAL DAILY
Qty: 90 TABLET | Refills: 2 | Status: SHIPPED | OUTPATIENT
Start: 2023-04-03

## 2023-04-03 NOTE — PROGRESS NOTES
"Chief Complaint  Hypothyroidism (Pt states that energy levels have been good, weight is stable, no family hx of thyroid disease. )    Subjective        Thea Pope presents to [facility]  History of Present Illness    Lab Results   Component Value Date    TSH 1.460 09/12/2022     Hypothyroidism   First came to see dr wellington 6/2021 for hypothyroid and acromegaly (family hx of acromegaly in her mother)  Currently patient is on Unithroid 75 mcg oral daily  Denies complication of hyper or hypothyroidism symptoms    Objective   Vital Signs:  /80   Pulse 98   Temp 97.3 °F (36.3 °C) (Temporal)   Ht 154.9 cm (60.98\")   Wt 68.6 kg (151 lb 3.2 oz)   SpO2 96%   BMI 28.58 kg/m²   Estimated body mass index is 28.58 kg/m² as calculated from the following:    Height as of this encounter: 154.9 cm (60.98\").    Weight as of this encounter: 68.6 kg (151 lb 3.2 oz).             Physical Exam  Vitals reviewed.   Constitutional:       General: She is not in acute distress.  HENT:      Head: Normocephalic and atraumatic.   Cardiovascular:      Rate and Rhythm: Normal rate.   Pulmonary:      Effort: Pulmonary effort is normal. No respiratory distress.   Musculoskeletal:         General: No signs of injury. Normal range of motion.      Cervical back: Normal range of motion and neck supple.   Skin:     General: Skin is warm and dry.   Neurological:      Mental Status: She is alert and oriented to person, place, and time. Mental status is at baseline.   Psychiatric:         Mood and Affect: Mood normal.         Behavior: Behavior normal.         Thought Content: Thought content normal.         Judgment: Judgment normal.        Result Review :  The following data was reviewed by: TISHA Garcia on 04/03/2023:  Common labs    Common Labs 9/12/22 11/8/22 11/8/22 11/8/22 11/8/22     0910 0910 0910 0910   Glucose 115 (A)  102 (A)     BUN 17  12     Creatinine 0.69  0.61     Sodium 140  142   "   Potassium 4.5  4.6     Chloride 102  104     Calcium 9.5  9.3     Total Protein   6.4     Albumin   4.20     Total Bilirubin   0.5     Alkaline Phosphatase   105     AST (SGOT)   17     ALT (SGPT)   25     WBC  7.47      Hemoglobin  12.2      Hematocrit  37.3      Platelets  277      Total Cholesterol     178   Triglycerides     95   HDL Cholesterol     52   LDL Cholesterol      109 (A)   Hemoglobin A1C    6.20 (A)    (A) Abnormal value       Comments are available for some flowsheets but are not being displayed.                        Assessment and Plan   Diagnoses and all orders for this visit:    1. Acquired hypothyroidism (Primary)  -     TSH  -     T4, Free  -     T3, Free    Other orders  -     Unithroid 75 MCG tablet; Take 1 tablet by mouth Daily.  Dispense: 90 tablet; Refill: 2             Follow Up   Return in about 1 year (around 4/3/2024).     Labs today  We will adjust dose as needed based on blood work    Patient was given instructions and counseling regarding her condition or for health maintenance advice. Please see specific information pulled into the AVS if appropriate.     TISHA Garcia

## 2023-04-04 LAB
T3FREE SERPL-MCNC: 3.2 PG/ML (ref 2–4.4)
T4 FREE SERPL-MCNC: 1.42 NG/DL (ref 0.93–1.7)
TSH SERPL DL<=0.005 MIU/L-ACNC: 0.89 UIU/ML (ref 0.27–4.2)

## 2023-04-10 ENCOUNTER — OFFICE VISIT (OUTPATIENT)
Dept: OBSTETRICS AND GYNECOLOGY | Facility: CLINIC | Age: 53
End: 2023-04-10
Payer: COMMERCIAL

## 2023-04-10 VITALS
DIASTOLIC BLOOD PRESSURE: 80 MMHG | WEIGHT: 151.2 LBS | BODY MASS INDEX: 28.55 KG/M2 | HEIGHT: 61 IN | SYSTOLIC BLOOD PRESSURE: 118 MMHG

## 2023-04-10 DIAGNOSIS — Z12.31 ENCOUNTER FOR SCREENING MAMMOGRAM FOR MALIGNANT NEOPLASM OF BREAST: ICD-10-CM

## 2023-04-10 DIAGNOSIS — N95.1 MENOPAUSAL SYMPTOM: ICD-10-CM

## 2023-04-10 DIAGNOSIS — Z00.00 ANNUAL PHYSICAL EXAM: Primary | ICD-10-CM

## 2023-04-10 DIAGNOSIS — N95.2 VAGINAL ATROPHY: ICD-10-CM

## 2023-04-10 RX ORDER — ESCITALOPRAM OXALATE 10 MG/1
10 TABLET ORAL DAILY
Qty: 90 TABLET | Refills: 3 | Status: SHIPPED | OUTPATIENT
Start: 2023-04-10

## 2023-04-10 NOTE — PROGRESS NOTES
GYN Annual Exam     CC- Here for annual exam.     Thea Pope is a 52 y.o. female established patient who presents for annual well woman exam. NO VB. She loves Lexapro 10 mg and feels much better in terms of HF and sleep. She uses E2 cream a few times a month and it works well. She just got back from [place].     OB History        2    Para   2    Term   2            AB        Living   2       SAB        IAB        Ectopic        Molar        Multiple        Live Births              Obstetric Comments   2 C/S             Menarche:10  Menopause:s/p TLH with OC   HRT:none  Current contraception: status post hysterectomy  History of abnormal Pap smear: no  History of abnormal mammogram: no  Family history of uterine, colon or ovarian cancer: ? MGM colon cancer  Family history of breast cancer: no  STD's: none  Last pap smear: 3/2022 normal pap/neg HPV  MOOKIE: none      Health Maintenance   Topic Date Due   • ZOSTER VACCINE (1 of 2) Never done   • COVID-19 Vaccine (5 - Booster for Moderna series) 2022   • Annual Gynecologic Pelvic and Breast Exam  2023   • INFLUENZA VACCINE  2023   • ANNUAL PHYSICAL  2023   • MAMMOGRAM  2025   • COLORECTAL CANCER SCREENING  10/31/2025   • TDAP/TD VACCINES (2 - Td or Tdap) 2030   • HEPATITIS C SCREENING  Completed   • Pneumococcal Vaccine 0-64  Aged Out       Past Medical History:   Diagnosis Date   • Acute upper respiratory infection, unspecified    • Allergic     seasonal   • Candidiasis     OF OTHER UROGENITAL SITES   • Colon polyp    • Cough    • Deficiency of other specified B group vitamins    • Gestational diabetes    • Hypothyroidism    • Pain in limb    • Pain in unspecified joint    • Plantar fasciitis    • Unspecified acute conjunctivitis, left eye    • Unspecified menopausal and perimenopausal disorder    • Vitamin B12 deficiency 2021   • Vitamin D deficiency 2021   • Vitamin D deficiency, unspecified        Past  Surgical History:   Procedure Laterality Date   •  SECTION      x 2   • COLONOSCOPY      2019, POLYPECTOMY WITH PRECANCEROUS CELLS, REPEAT Q3Y, DUE    • COLONOSCOPY W/ POLYPECTOMY N/A 10/31/2022    Procedure: COLONOSCOPY WITH POLYPECTOMY;  Surgeon: Vlad Elkins MD;  Location: [facility];  Service: Gastroenterology;  Laterality: N/A;  polyps   • HYSTERECTOMY      TLH with OC   • PELVIC LAPAROSCOPY  2014    dx lsc, removal of adhesion from cuff         Current Outpatient Medications:   •  escitalopram (LEXAPRO) 10 MG tablet, Take 1 tablet by mouth Daily., Disp: 90 tablet, Rfl: 3  •  estradiol (ESTRACE) 0.1 MG/GM vaginal cream, INSERT 1 GRAM VAGINALLY TWICE A WEEK, Disp: 43 g, Rfl: 0  •  meloxicam (MOBIC) 7.5 MG tablet, , Disp: , Rfl:   •  Unithroid 75 MCG tablet, Take 1 tablet by mouth Daily., Disp: 90 tablet, Rfl: 2    No Known Allergies    Social History     Tobacco Use   • Smoking status: Never   • Smokeless tobacco: Never   Vaping Use   • Vaping Use: Never used   Substance Use Topics   • Alcohol use: Yes   • Drug use: No       Family History   Problem Relation Age of Onset   • Diabetes Mother         PREDIABETES   • Acromegaly Mother    • Hyperlipidemia Father    • Colon cancer Maternal Grandmother    • Breast cancer Neg Hx    • Ovarian cancer Neg Hx    • Deep vein thrombosis Neg Hx    • Pulmonary embolism Neg Hx        Review of Systems   Constitutional: Negative for activity change, appetite change, fatigue, fever and unexpected weight change.   Respiratory: Negative for cough and shortness of breath.    Cardiovascular: Negative for chest pain and palpitations.   Gastrointestinal: Negative for abdominal distention, abdominal pain, constipation, diarrhea and nausea.   Endocrine: Negative for heat intolerance.   Genitourinary: Negative for dyspareunia, dysuria, menstrual problem, pelvic pain, vaginal bleeding, vaginal discharge and vaginal pain.   Skin: Negative for color change  "and rash.   Neurological: Negative for headaches.   Psychiatric/Behavioral: Negative for dysphoric mood and sleep disturbance. The patient is not nervous/anxious.        /80   Ht 154.9 cm (60.98\")   Wt 68.6 kg (151 lb 3.2 oz)   BMI 28.58 kg/m²     Physical Exam   Constitutional: She is oriented to person, place, and time. She appears well-developed.   HENT:   Head: Normocephalic and atraumatic.   Eyes: Conjunctivae are normal. No scleral icterus.   Neck: No thyromegaly present.   Cardiovascular: Normal rate and regular rhythm.   Pulmonary/Chest: Effort normal and breath sounds normal. Right breast exhibits no inverted nipple, no mass, no nipple discharge, no skin change and no tenderness. Left breast exhibits no inverted nipple, no mass, no nipple discharge, no skin change and no tenderness.   Abdominal: Soft. Normal appearance and bowel sounds are normal. She exhibits no distension and no mass. There is no abdominal tenderness. There is no rebound and no guarding. No hernia.   Genitourinary:    Rectum normal.      Pelvic exam was performed with patient supine.   There is no rash, tenderness or lesion on the right labia. There is no rash, tenderness or lesion on the left labia. Right adnexum displays no mass, no tenderness and no fullness. Left adnexum displays no mass, no tenderness and no fullness.    No vaginal discharge, erythema, tenderness or bleeding.   No erythema, tenderness or bleeding in the vagina.    No foreign body in the vagina.      No signs of injury in the vagina.      Genitourinary Comments: Grade 1 cystocele  Uterus and cervix absent       Neurological: She is alert and oriented to person, place, and time.   Skin: Skin is warm and dry.   Psychiatric: Her behavior is normal. Mood, judgment and thought content normal.   Nursing note and vitals reviewed.         Assessment/Plan    1) GYN HM: normal  pap/HPV  13/2022  SBE demonstrated and encouraged.  2) STD screening: declines Condoms " encouraged.  3) Bone health - Weight bearing exercise, dietary calcium recommendations and vitamin D reviewed.   4) Diet and Exercise discussed  5) Smoking Status: No   6) Hot flashes and night sweats- doing well on Lexapro 10 mg,ERX called in.   7)MMG:  UTD 1/2023- B1, schedule MMG 1/2024  8) DEXA- UTD 11/2022- normal BMD. Repeat in 3-5 years  9)C scope-UTD 11/2022, repeat 5 years per path report  10) Grade 1 cystocele- pt asymptomatic- Kegel's encrouaged  11) Vaginal atrophy - improved with Estrace. Patient counseled that vaginal estrogen rings, creams and tablets are available and highly effective at treating local vaginal symptoms such as atrophy and vaginal dryness.  Vaginal estrogen does not cause uterine overgrowth and does not require a progestogen to protect the uterus.  Very small amounts of estrogen are absorbed systemically.  For patients with a history of an estrogen dependent cancer such as breast cancer, the decision to use local estrogen for local vaginal symptoms should be made after consultation with her oncologist.  Possible side effects include local irritation or burning and/or vaginal bleeding and should always be reported.  Will call for refills when needed.   12)I saw the patient with a face mask, gloves and eye protection  The patient herself was masked.  Social distancing was observed as appropriate.  13)Parts of this document have been copied or forwarded from her previous visits and have been reviewed, updated and edited as indicated. .   14) Follow up prn and 1 year annual        Diagnoses and all orders for this visit:    1. Annual physical exam (Primary)  -     POC Urinalysis Dipstick    2. Encounter for screening mammogram for malignant neoplasm of breast  -     Mammo Screening Digital Tomosynthesis Bilateral With CAD; Future    3. Menopausal symptom    4. Vaginal atrophy    Other orders  -     escitalopram (LEXAPRO) 10 MG tablet; Take 1 tablet by mouth Daily.  Dispense: 90 tablet;  Refill: 3        Monica Jones MD  4/10/2023  15:07 EDT

## 2023-10-05 ENCOUNTER — OFFICE VISIT (OUTPATIENT)
Dept: INTERNAL MEDICINE | Facility: CLINIC | Age: 53
End: 2023-10-05
Payer: COMMERCIAL

## 2023-10-05 VITALS
OXYGEN SATURATION: 98 % | HEIGHT: 61 IN | DIASTOLIC BLOOD PRESSURE: 74 MMHG | TEMPERATURE: 98 F | BODY MASS INDEX: 29.07 KG/M2 | HEART RATE: 74 BPM | WEIGHT: 154 LBS | SYSTOLIC BLOOD PRESSURE: 120 MMHG | RESPIRATION RATE: 18 BRPM

## 2023-10-05 DIAGNOSIS — E03.9 ACQUIRED HYPOTHYROIDISM: Primary | ICD-10-CM

## 2023-10-05 DIAGNOSIS — M79.645 PAIN OF LEFT THUMB: ICD-10-CM

## 2023-10-05 DIAGNOSIS — M15.9 OSTEOARTHRITIS INVOLVING MULTIPLE JOINTS ON BOTH SIDES OF BODY: ICD-10-CM

## 2023-10-05 DIAGNOSIS — L91.8 INFLAMED SKIN TAG: ICD-10-CM

## 2023-10-05 RX ORDER — MELOXICAM 7.5 MG/1
7.5 TABLET ORAL DAILY
Qty: 90 TABLET | Refills: 1 | Status: SHIPPED | OUTPATIENT
Start: 2023-10-05

## 2023-10-05 NOTE — PROGRESS NOTES
"Chief Complaint  Nevus (Chest ), Peripheral Neuropathy (Feet ), and Pain (Left thumb x 2 month )    Subjective          Thea Pope presents to [facility] for several acute issues.   She is having a lot of pain in her feet and legs- went to ortho and was given meloxicam which did help but then stopped taking it and pain came right back.   Also with a mole on her chest that gets irritated and rubbed against her bra strap.   Pain in her L thumb x 2 months at base. Hurts all the time. Gripping makes it worse.     Objective   Vital Signs:     /74   Pulse 74   Temp 98 °F (36.7 °C)   Resp 18   Ht 154.9 cm (61\")   Wt 69.9 kg (154 lb)   SpO2 98%   BMI 29.10 kg/m²     Physical Exam  Vitals and nursing note reviewed.   Constitutional:       General: She is not in acute distress.     Appearance: Normal appearance.   Cardiovascular:      Rate and Rhythm: Normal rate and regular rhythm.      Pulses: Normal pulses.      Heart sounds: Normal heart sounds. No murmur heard.  Pulmonary:      Effort: Pulmonary effort is normal. No respiratory distress.      Breath sounds: Normal breath sounds.   Abdominal:      General: Abdomen is flat. Bowel sounds are normal.      Palpations: Abdomen is soft.      Tenderness: There is no abdominal tenderness.   Musculoskeletal:      Right lower leg: No edema.      Left lower leg: No edema.      Comments: TTP over L 1st MCP joint and down base of thumb and at carpal joints   Skin:     Comments: 2 small skin tags present over L chest, irritated to sore with manipulation   Neurological:      Mental Status: She is alert and oriented to person, place, and time. Mental status is at baseline.   Psychiatric:         Mood and Affect: Mood normal.         Behavior: Behavior normal.        Result Review :   The following data was reviewed by: Estephania Solorzano MD on 10/05/2023:  CMP          11/8/2022    09:10   CMP   Glucose 102    BUN 12  "   Creatinine 0.61    Sodium 142    Potassium 4.6    Chloride 104    Calcium 9.3    Total Protein 6.4    Albumin 4.20    Globulin 2.2    Total Bilirubin 0.5    Alkaline Phosphatase 105    AST (SGOT) 17    ALT (SGPT) 25    BUN/Creatinine Ratio 19.7      CBC w/diff          11/8/2022    09:10   CBC w/Diff   WBC 7.47    RBC 4.17    Hemoglobin 12.2    Hematocrit 37.3    MCV 89.4    MCH 29.3    MCHC 32.7    RDW 12.9    Platelets 277    Neutrophil Rel % 63.0    Lymphocyte Rel % 26.4    Monocyte Rel % 8.7    Eosinophil Rel % 1.1    Basophil Rel % 0.5      Lipid Panel          11/8/2022    09:10   Lipid Panel   Total Cholesterol 178    Triglycerides 95    HDL Cholesterol 52    VLDL Cholesterol 17    LDL Cholesterol  109      TSH          4/3/2023    13:32   TSH   TSH 0.885      Most Recent A1C          11/8/2022    09:10   HGBA1C Most Recent   Hemoglobin A1C 6.20         Assessment and Plan      Diagnoses and all orders for this visit:    1. Acquired hypothyroidism (Primary)  Assessment & Plan:  CONTROLLED  - TSH today for ongoing monitoring  - cont on current dose of LTX for now, will adjust dose as indicated based on labs      Orders:  -     TSH    2. Pain of left thumb   - suspect OA, may benefit from steroid injection, will refer to hand surgery as below   - inconsistent with Dupytrens contracture, will not recommend bracing at this time     Orders:  -     Ambulatory Referral to Hand Surgery    3. Osteoarthritis involving multiple joints on both sides of body   - will get BMP today to ensure renal function is appropriate for ongoing NSAID use   - if Cr ok, will go back to meloxicam use for arthritis, worse in LE joints--> can take daily x 1 month and then can space to every other day or even prn dosing from there     Orders:  -     Basic Metabolic Panel  -     meloxicam (MOBIC) 7.5 MG tablet; Take 1 tablet by mouth Daily.  Dispense: 90 tablet; Refill: 1    4. Inflamed skin tag  Skin Tag Removal    Date/Time: 10/5/2023  3:51 PM  Performed by: Estephania Solorzano MD  Authorized by: Estephania Solorzano MD   Preparation: Patient was prepped and draped in the usual sterile fashion.  Local anesthesia used: yes    Anesthesia:  Local anesthesia used: yes  Local Anesthetic: lidocaine 1% without epinephrine  Anesthetic total: 2 mL    Sedation:  Patient sedated: no    Patient tolerance: patient tolerated the procedure well with no immediate complications          Follow Up   Return in about 2 months (around 12/5/2023) for Annual physical.    Patient was given instructions and counseling regarding her condition or for health maintenance advice. Please see specific information pulled into the AVS if appropriate.     Michelle Solorzano MD  [facility] [place] [facility]  Phone: [phone]  Fax: [phone]

## 2023-10-06 LAB
BUN SERPL-MCNC: 17 MG/DL (ref 6–24)
BUN/CREAT SERPL: 29 (ref 9–23)
CALCIUM SERPL-MCNC: 9.1 MG/DL (ref 8.7–10.2)
CHLORIDE SERPL-SCNC: 103 MMOL/L (ref 96–106)
CO2 SERPL-SCNC: 22 MMOL/L (ref 20–29)
CREAT SERPL-MCNC: 0.59 MG/DL (ref 0.57–1)
EGFRCR SERPLBLD CKD-EPI 2021: 107 ML/MIN/1.73
GLUCOSE SERPL-MCNC: 100 MG/DL (ref 70–99)
POTASSIUM SERPL-SCNC: 4.4 MMOL/L (ref 3.5–5.2)
SODIUM SERPL-SCNC: 140 MMOL/L (ref 134–144)
TSH SERPL DL<=0.005 MIU/L-ACNC: 0.86 UIU/ML (ref 0.45–4.5)

## 2024-01-02 RX ORDER — LEVOTHYROXINE SODIUM 75 UG/1
75 TABLET ORAL DAILY
Qty: 90 TABLET | Refills: 0 | Status: SHIPPED | OUTPATIENT
Start: 2024-01-02

## 2024-01-02 NOTE — TELEPHONE ENCOUNTER
Rx Refill Note  Requested Prescriptions     Pending Prescriptions Disp Refills    Unithroid 75 MCG tablet [Pharmacy Med Name: UNITHROID 75 MCG TABLET] 30 tablet 1     Sig: TAKE ONE TABLET BY MOUTH DAILY      Last office visit with prescribing clinician: 4/3/2023   Last telemedicine visit with prescribing clinician: Visit date not found   Next office visit with prescribing clinician: 4/8/2024                         Would you like a call back once the refill request has been completed: [] Yes [] No    If the office needs to give you a call back, can they leave a voicemail: [] Yes [] No    Flor Garcia MA  01/02/24, 08:28 EST

## 2024-02-26 ENCOUNTER — HOSPITAL ENCOUNTER (OUTPATIENT)
Dept: MAMMOGRAPHY | Facility: HOSPITAL | Age: 54
Discharge: HOME | End: 2024-02-26
Admitting: OBSTETRICS & GYNECOLOGY
Payer: COMMERCIAL

## 2024-02-26 DIAGNOSIS — Z12.31 ENCOUNTER FOR SCREENING MAMMOGRAM FOR MALIGNANT NEOPLASM OF BREAST: ICD-10-CM

## 2024-02-26 PROCEDURE — 77063 BREAST TOMOSYNTHESIS BI: CPT

## 2024-02-26 PROCEDURE — 77063 BREAST TOMOSYNTHESIS BI: CPT | Performed by: RADIOLOGY

## 2024-02-26 PROCEDURE — 77067 SCR MAMMO BI INCL CAD: CPT | Performed by: RADIOLOGY

## 2024-02-26 PROCEDURE — 77067 SCR MAMMO BI INCL CAD: CPT

## 2024-03-06 RX ORDER — ESTRADIOL 0.1 MG/G
1 CREAM VAGINAL 2 TIMES WEEKLY
Qty: 43 G | Refills: 1 | Status: SHIPPED | OUTPATIENT
Start: 2024-03-07

## 2024-03-20 ENCOUNTER — TRANSCRIBE ORDERS (OUTPATIENT)
Dept: SLEEP MEDICINE | Facility: HOSPITAL | Age: 54
End: 2024-03-20
Payer: COMMERCIAL

## 2024-03-20 DIAGNOSIS — R06.83 SNORING: Primary | ICD-10-CM

## 2024-03-29 RX ORDER — LEVOTHYROXINE SODIUM 75 UG/1
75 TABLET ORAL DAILY
Qty: 30 TABLET | Refills: 0 | Status: SHIPPED | OUTPATIENT
Start: 2024-03-29

## 2024-03-29 NOTE — TELEPHONE ENCOUNTER
Rx Refill Note  Requested Prescriptions     Pending Prescriptions Disp Refills    Unithroid 75 MCG tablet [Pharmacy Med Name: UNITHROID 75 MCG TABLET] 30 tablet 0     Sig: TAKE 1 TABLET BY MOUTH DAILY      Last office visit with prescribing clinician: 4/3/2023   Last telemedicine visit with prescribing clinician: Visit date not found   Next office visit with prescribing clinician: 4/8/2024                         Would you like a call back once the refill request has been completed: [] Yes [] No    If the office needs to give you a call back, can they leave a voicemail: [] Yes [] No    Jerrica Garcia  03/29/24, 07:52 EDT

## 2024-04-15 ENCOUNTER — TELEPHONE (OUTPATIENT)
Dept: OBSTETRICS AND GYNECOLOGY | Facility: CLINIC | Age: 54
End: 2024-04-15

## 2024-04-15 ENCOUNTER — OFFICE VISIT (OUTPATIENT)
Dept: OBSTETRICS AND GYNECOLOGY | Facility: CLINIC | Age: 54
End: 2024-04-15
Payer: COMMERCIAL

## 2024-04-15 VITALS
HEIGHT: 61 IN | DIASTOLIC BLOOD PRESSURE: 86 MMHG | BODY MASS INDEX: 28.13 KG/M2 | WEIGHT: 149 LBS | SYSTOLIC BLOOD PRESSURE: 124 MMHG

## 2024-04-15 DIAGNOSIS — Z12.72 SMEAR, VAGINAL, AS PART OF ROUTINE GYNECOLOGICAL EXAMINATION: ICD-10-CM

## 2024-04-15 DIAGNOSIS — Z01.419 SMEAR, VAGINAL, AS PART OF ROUTINE GYNECOLOGICAL EXAMINATION: ICD-10-CM

## 2024-04-15 DIAGNOSIS — Z11.51 SPECIAL SCREENING EXAMINATION FOR HUMAN PAPILLOMAVIRUS (HPV): ICD-10-CM

## 2024-04-15 DIAGNOSIS — Z79.899 HIGH RISK MEDICATION USE: Primary | ICD-10-CM

## 2024-04-15 DIAGNOSIS — Z79.890 HORMONE REPLACEMENT THERAPY (HRT): ICD-10-CM

## 2024-04-15 DIAGNOSIS — Z01.419 ROUTINE GYNECOLOGICAL EXAMINATION: ICD-10-CM

## 2024-04-15 DIAGNOSIS — N95.1 MENOPAUSAL SYMPTOM: ICD-10-CM

## 2024-04-15 DIAGNOSIS — Z12.31 ENCOUNTER FOR SCREENING MAMMOGRAM FOR MALIGNANT NEOPLASM OF BREAST: ICD-10-CM

## 2024-04-15 DIAGNOSIS — N95.2 VAGINAL ATROPHY: ICD-10-CM

## 2024-04-15 LAB
ALBUMIN SERPL-MCNC: 4.4 G/DL (ref 3.5–5.2)
ALBUMIN/GLOB SERPL: 2.2 G/DL
ALP SERPL-CCNC: 127 U/L (ref 39–117)
ALT SERPL-CCNC: 44 U/L (ref 1–33)
AST SERPL-CCNC: 27 U/L (ref 1–32)
BILIRUB BLD-MCNC: NEGATIVE MG/DL
BILIRUB SERPL-MCNC: 0.6 MG/DL (ref 0–1.2)
BUN SERPL-MCNC: 10 MG/DL (ref 6–20)
BUN/CREAT SERPL: 16.1 (ref 7–25)
CALCIUM SERPL-MCNC: 9.3 MG/DL (ref 8.6–10.5)
CHLORIDE SERPL-SCNC: 105 MMOL/L (ref 98–107)
CLARITY, POC: CLEAR
CO2 SERPL-SCNC: 27.6 MMOL/L (ref 22–29)
COLOR UR: YELLOW
CREAT SERPL-MCNC: 0.62 MG/DL (ref 0.57–1)
EGFRCR SERPLBLD CKD-EPI 2021: 105.3 ML/MIN/1.73
GLOBULIN SER CALC-MCNC: 2 GM/DL
GLUCOSE SERPL-MCNC: 92 MG/DL (ref 65–99)
GLUCOSE UR STRIP-MCNC: NEGATIVE MG/DL
KETONES UR QL: NEGATIVE
LEUKOCYTE EST, POC: NEGATIVE
NITRITE UR-MCNC: NEGATIVE MG/ML
PH UR: 5 [PH] (ref 5–8)
POTASSIUM SERPL-SCNC: 4.2 MMOL/L (ref 3.5–5.2)
PROT SERPL-MCNC: 6.4 G/DL (ref 6–8.5)
PROT UR STRIP-MCNC: NEGATIVE MG/DL
RBC # UR STRIP: NEGATIVE /UL
SODIUM SERPL-SCNC: 143 MMOL/L (ref 136–145)
SP GR UR: 1 (ref 1–1.03)
UROBILINOGEN UR QL: NORMAL

## 2024-04-15 PROCEDURE — 99212 OFFICE O/P EST SF 10 MIN: CPT | Performed by: OBSTETRICS & GYNECOLOGY

## 2024-04-15 PROCEDURE — 99396 PREV VISIT EST AGE 40-64: CPT | Performed by: OBSTETRICS & GYNECOLOGY

## 2024-04-15 PROCEDURE — 81002 URINALYSIS NONAUTO W/O SCOPE: CPT | Performed by: OBSTETRICS & GYNECOLOGY

## 2024-04-15 RX ORDER — ESTRADIOL 0.1 MG/G
1 CREAM VAGINAL 2 TIMES WEEKLY
Qty: 43 G | Refills: 6 | Status: SHIPPED | OUTPATIENT
Start: 2024-04-15

## 2024-04-15 RX ORDER — ESTRADIOL 0.05 MG/D
1 PATCH, EXTENDED RELEASE TRANSDERMAL 2 TIMES WEEKLY
Qty: 8 PATCH | Refills: 2 | Status: SHIPPED | OUTPATIENT
Start: 2024-04-15

## 2024-04-15 NOTE — PROGRESS NOTES
GYN Annual Exam     CC- Here for annual exam.     Thea Pope is a 53 y.o. female established patient who presents for annual well woman exam. NO VB.  She had previously been on Lexapro 10 mg but had to stop because she developed constant yawning and coughing.  Once she stopped Lexapro, the symptoms resolved.  She is still having hot flashes.  We discussed treatment options including another SSRI, Veozah and HRT.  She wanted to try Veozah first but her insurance would not cover it, therefore we started her on Vivelle dot 0.05 mg and she will follow-up in 2 months for recheck of symptoms.  She is using vaginal estrogen cream and it is working well.      OB History          2    Para   2    Term   2            AB        Living   2         SAB        IAB        Ectopic        Molar        Multiple        Live Births              Obstetric Comments   2 C/S               Menarche:10  Menopause:s/p TLH with OC   HRT:none  Current contraception: status post hysterectomy  History of abnormal Pap smear: no  History of abnormal mammogram: no  Family history of uterine, colon or ovarian cancer: ? MGM colon cancer  Family history of breast cancer: no  STD's: none  Last pap smear: 3/2022 normal pap/neg HPV  MOOKIE: none      Health Maintenance   Topic Date Due    ZOSTER VACCINE (1 of 2) Never done    BMI FOLLOWUP  2023    Annual Gynecologic Pelvic and Breast Exam  2023    COVID-19 Vaccine (2023- season) 2023    ANNUAL PHYSICAL  04/10/2024    INFLUENZA VACCINE  2024    COLORECTAL CANCER SCREENING  10/31/2025    MAMMOGRAM  2026    TDAP/TD VACCINES (2 - Td or Tdap) 2030    HEPATITIS C SCREENING  Completed    Pneumococcal Vaccine 0-64  Aged Out       Past Medical History:   Diagnosis Date    Acute upper respiratory infection, unspecified     Allergic     seasonal    Candidiasis     OF OTHER UROGENITAL SITES    Colon polyp     Cough     Deficiency of other specified B group  vitamins     Gestational diabetes     Hypothyroidism     Pain in limb     Pain in unspecified joint     Plantar fasciitis     Unspecified acute conjunctivitis, left eye     Unspecified menopausal and perimenopausal disorder     Vitamin B12 deficiency 2021    Vitamin D deficiency 2021    Vitamin D deficiency, unspecified        Past Surgical History:   Procedure Laterality Date     SECTION      x 2    COLONOSCOPY      , POLYPECTOMY WITH PRECANCEROUS CELLS, REPEAT Q3Y, DUE     COLONOSCOPY W/ POLYPECTOMY N/A 10/31/2022    Procedure: COLONOSCOPY WITH POLYPECTOMY;  Surgeon: Vlad Elkins MD;  Location: [facility];  Service: Gastroenterology;  Laterality: N/A;  polyps    HYSTERECTOMY      TLH with OC    PELVIC LAPAROSCOPY  2014    dx lsc, removal of adhesion from cuff         Current Outpatient Medications:     estradiol (ESTRACE) 0.1 MG/GM vaginal cream, Insert 1 g into the vagina 2 (Two) Times a Week., Disp: 43 g, Rfl: 6    meloxicam (MOBIC) 7.5 MG tablet, Take 1 tablet by mouth Daily., Disp: 90 tablet, Rfl: 1    Unithroid 75 MCG tablet, TAKE 1 TABLET BY MOUTH DAILY, Disp: 30 tablet, Rfl: 0    escitalopram (LEXAPRO) 10 MG tablet, Take 1 tablet by mouth Daily. (Patient not taking: Reported on 4/15/2024), Disp: 90 tablet, Rfl: 3    estradiol (MINIVELLE, VIVELLE-DOT) 0.05 MG/24HR patch, Place 1 patch on the skin as directed by provider 2 (Two) Times a Week., Disp: 8 patch, Rfl: 2    Fezolinetant (VEOZAH) 45 MG tablet, Take 1 tablet by mouth Daily., Disp: 30 tablet, Rfl: 3    No Known Allergies    Social History     Tobacco Use    Smoking status: Never    Smokeless tobacco: Never   Vaping Use    Vaping status: Never Used   Substance Use Topics    Alcohol use: Yes    Drug use: No       Family History   Problem Relation Age of Onset    Hyperlipidemia Father     Diabetes Mother         PREDIABETES    Acromegaly Mother     Colon cancer Maternal Grandmother     Breast cancer Neg  "Hx     Ovarian cancer Neg Hx     Deep vein thrombosis Neg Hx     Pulmonary embolism Neg Hx     Uterine cancer Neg Hx        Review of Systems   Constitutional:  Negative for activity change, appetite change, fatigue, fever and unexpected weight change.   Respiratory:  Negative for cough and shortness of breath.    Cardiovascular:  Negative for chest pain and palpitations.   Gastrointestinal:  Negative for abdominal distention, abdominal pain, constipation, diarrhea and nausea.   Endocrine: Positive for cold intolerance and heat intolerance.   Genitourinary:  Negative for dyspareunia, dysuria, menstrual problem, pelvic pain, vaginal bleeding, vaginal discharge and vaginal pain.   Skin:  Negative for color change and rash.   Neurological:  Negative for headaches.   Psychiatric/Behavioral:  Positive for sleep disturbance. Negative for dysphoric mood. The patient is not nervous/anxious.        /86   Ht 154.9 cm (61\")   Wt 67.6 kg (149 lb)   BMI 28.15 kg/m²     Physical Exam   Constitutional: She is oriented to person, place, and time. She appears well-developed.   HENT:   Head: Normocephalic and atraumatic.   Eyes: Conjunctivae are normal. No scleral icterus.   Neck: No thyromegaly present.   Cardiovascular: Normal rate and regular rhythm.   Pulmonary/Chest: Effort normal and breath sounds normal. Right breast exhibits no inverted nipple, no mass, no nipple discharge, no skin change and no tenderness. Left breast exhibits no inverted nipple, no mass, no nipple discharge, no skin change and no tenderness.   Abdominal: Soft. Normal appearance and bowel sounds are normal. She exhibits no distension and no mass. There is no abdominal tenderness. There is no rebound and no guarding. No hernia.   Genitourinary:    Rectum normal.      Pelvic exam was performed with patient supine.   There is no rash, tenderness or lesion on the right labia. There is no rash, tenderness or lesion on the left labia. Right adnexum " displays no mass, no tenderness and no fullness. Left adnexum displays no mass, no tenderness and no fullness.    No vaginal discharge, erythema, tenderness or bleeding.   No erythema, tenderness or bleeding in the vagina.    No foreign body in the vagina.      No signs of injury in the vagina.      Genitourinary Comments: Grade 1 cystocele  Uterus and cervix absent       Neurological: She is alert and oriented to person, place, and time.   Skin: Skin is warm and dry.   Psychiatric: Her behavior is normal. Mood, judgment and thought content normal.   Nursing note and vitals reviewed.         Assessment/Plan    1) GYN HM: normal  pap/HPV  3/2022, check pap/HPV  SBE demonstrated and encouraged.  2) STD screening: declines Condoms encouraged.  3) Bone health - Weight bearing exercise, dietary calcium recommendations and vitamin D reviewed.   4) Diet and Exercise discussed  5) Smoking Status: No   6) Hot flashes and night sweats-developed side effects to Lexapro and had to stop.  Hot flashes and night sweats have returned.  We tried to get Veozah covered but it was not part of her plan, so she is going to start Minivelle 0.05 mg twice a week. Patient counseled that hormone treatment should be used to help with specific symptoms related to menopause such as hot flashes, night sweats and vaginal atrophy.  Hormones should not be given for “general wellbeing” or to avoid aging. The lowest dose hormone that treats symptoms should be used for the shortest amount of time possible.  Although estrogen is the most effective treatment for hot flashes, other non hormonal options exist (such as Brisdelle or venlafaxine) and should also be considered.  Anyone with an intact uterus should also receive progestogen to prevent uterine overgrowth that can lead to uterine cancer.  All hormone therapy, whether it is synthetic or bio identical, can lead to increased risk of stroke, heart attack and thromboembolic diseases.  These adverse  events are more likely to develop in the first year of use and in patients who are older than the typical menopausal age.  Risks of estrogen dependent cancers such as breast cancer increase with prolonged use.  Attempts to wean hormone therapy should be discussed annually and particularly after three to five years of use.  Any side effects such as vaginal bleeding or pain should be reported immediately.  RTO in 2 months to recheck symptoms.   7)MMG:  UTD 2/2024- B1, schedule MMG 2/2025  8) DEXA- UTD 11/2022- normal BMD. Repeat in 3-5 years  9)C scope-UTD 11/2022, repeat 5 years per path report  10) Grade 1 cystocele- pt asymptomatic- Kegel's encrouaged  11) Vaginal atrophy - improved with Estrace. Patient counseled that vaginal estrogen rings, creams and tablets are available and highly effective at treating local vaginal symptoms such as atrophy and vaginal dryness.  Vaginal estrogen does not cause uterine overgrowth and does not require a progestogen to protect the uterus.  Very small amounts of estrogen are absorbed systemically.  For patients with a history of an estrogen dependent cancer such as breast cancer, the decision to use local estrogen for local vaginal symptoms should be made after consultation with her oncologist.  Possible side effects include local irritation or burning and/or vaginal bleeding and should always be reported.  Will call for refills when needed.   12)High risk medication use-CMP checked for possible use of Veozah.   13)Parts of this document have been copied or forwarded from her previous visits and have been reviewed, updated and edited as indicated. .   14) Follow up prn and 1 year annual   15)  I spent > 10  minutes on the separately reported service of  HRT and menopausal symptoms. . This time is not included in the time used to support the annual E/M service also reported today.         Diagnoses and all orders for this visit:    1. High risk medication use (Primary)  -      Comprehensive Metabolic Panel    2. Routine gynecological examination  -     POC Urinalysis Dipstick  -     IGP, Apt HPV,rfx 16 / 18,45    3. Smear, vaginal, as part of routine gynecological examination  -     POC Urinalysis Dipstick  -     IGP, Apt HPV,rfx 16 / 18,45    4. Special screening examination for human papillomavirus (HPV)  -     POC Urinalysis Dipstick  -     IGP, Apt HPV,rfx 16 / 18,45    5. Encounter for screening mammogram for malignant neoplasm of breast  -     Mammo Screening Digital Tomosynthesis Bilateral With CAD; Future    6. Menopausal symptom    7. Hormone replacement therapy (HRT)    8. Vaginal atrophy    Other orders  -     Fezolinetant (VEOZAH) 45 MG tablet; Take 1 tablet by mouth Daily.  Dispense: 30 tablet; Refill: 3  -     estradiol (ESTRACE) 0.1 MG/GM vaginal cream; Insert 1 g into the vagina 2 (Two) Times a Week.  Dispense: 43 g; Refill: 6  -     estradiol (MINIVELLE, VIVELLE-DOT) 0.05 MG/24HR patch; Place 1 patch on the skin as directed by provider 2 (Two) Times a Week.  Dispense: 8 patch; Refill: 2        Monica Jones MD  4/15/2024  12:51 EDT

## 2024-04-15 NOTE — TELEPHONE ENCOUNTER
Mrs. Pope now wants to cancel the veozah and the patch and also wants to cancel her followup for the veozah in July.    She would like to decrease her Lexapro to .5 mg.    If that doesn't work, she will call back to make an appointment to discuss options again.       Please advise.  Do you want me to go ahead and call the pharmacy to cancel the veozah and patch, and the appointment.

## 2024-04-16 NOTE — PROGRESS NOTES
I called and Mrs. Pope now wants to cancel the veozah and the patch and also wants to cancel her followup for the veozah in July.

## 2024-04-16 NOTE — TELEPHONE ENCOUNTER
I called the pharmacy per Ms. Pope's request.   I cancelled the RX for the patch and veozah, but left them on her profile, in case she changes her mind.

## 2024-04-17 LAB
CYTOLOGIST CVX/VAG CYTO: NORMAL
CYTOLOGY CVX/VAG DOC CYTO: NORMAL
CYTOLOGY CVX/VAG DOC THIN PREP: NORMAL
DX ICD CODE: NORMAL
HPV I/H RISK 4 DNA CVX QL PROBE+SIG AMP: NEGATIVE
Lab: NORMAL
OTHER STN SPEC: NORMAL
STAT OF ADQ CVX/VAG CYTO-IMP: NORMAL

## 2024-04-17 NOTE — PROGRESS NOTES
PIP= liver enzymes are mildly elevated. Would not use Veozah. Repeat CMP in 1 month and if her values are still abnormal she will need to f/u with her primary care MD.

## 2024-04-22 ENCOUNTER — OFFICE VISIT (OUTPATIENT)
Dept: ENDOCRINOLOGY | Age: 54
End: 2024-04-22
Payer: COMMERCIAL

## 2024-04-22 VITALS
SYSTOLIC BLOOD PRESSURE: 128 MMHG | OXYGEN SATURATION: 98 % | BODY MASS INDEX: 28.25 KG/M2 | WEIGHT: 149.6 LBS | DIASTOLIC BLOOD PRESSURE: 74 MMHG | HEART RATE: 102 BPM | HEIGHT: 61 IN | TEMPERATURE: 98.6 F

## 2024-04-22 DIAGNOSIS — E66.3 OVERWEIGHT (BMI 25.0-29.9): ICD-10-CM

## 2024-04-22 DIAGNOSIS — E03.9 ACQUIRED HYPOTHYROIDISM: Primary | ICD-10-CM

## 2024-04-22 PROCEDURE — 99214 OFFICE O/P EST MOD 30 MIN: CPT | Performed by: NURSE PRACTITIONER

## 2024-04-22 NOTE — PROGRESS NOTES
"Chief Complaint  Hypothyroidism    Subjective        Thea Pope presents to [facility] ENDOCRINOLOGY  History of Present Illness    Hypothyroidism   First came to see dr wellington 6/2021 for hypothyroid and acromegaly (family hx of acromegaly in her mother)  Dm regimen: Unithroid 75 mcg oral daily in the morning with water only   Complaining of weight gain, hair loss  Has hot flashes at night r/t menopause  Denies constipation and diarrhea      Objective   Vital Signs:  /74 (BP Location: Left arm, Patient Position: Sitting)   Pulse 102   Temp 98.6 °F (37 °C) (Oral)   Ht 154.9 cm (60.98\")   Wt 67.9 kg (149 lb 9.6 oz)   SpO2 98%   BMI 28.28 kg/m²   Estimated body mass index is 28.28 kg/m² as calculated from the following:    Height as of this encounter: 154.9 cm (60.98\").    Weight as of this encounter: 67.9 kg (149 lb 9.6 oz).             Physical Exam  Vitals reviewed.   Constitutional:       General: She is not in acute distress.  HENT:      Head: Normocephalic and atraumatic.   Cardiovascular:      Rate and Rhythm: Normal rate.   Pulmonary:      Effort: Pulmonary effort is normal. No respiratory distress.   Musculoskeletal:         General: No signs of injury. Normal range of motion.      Cervical back: Normal range of motion and neck supple.   Skin:     General: Skin is warm and dry.   Neurological:      Mental Status: She is alert and oriented to person, place, and time. Mental status is at baseline.   Psychiatric:         Mood and Affect: Mood normal.         Behavior: Behavior normal.         Thought Content: Thought content normal.         Judgment: Judgment normal.          Result Review :    The following data was reviewed by: TISHA Garcia on 04/22/2024:  Common labs          10/5/2023    15:45 4/15/2024    11:50   Common Labs   Glucose 100  92    BUN 17  10    Creatinine 0.59  0.62    Sodium 140  143    Potassium 4.4  4.2    Chloride 103  105    Calcium 9.1  9.3  "   Total Protein  6.4    Albumin  4.4    Total Bilirubin  0.6    Alkaline Phosphatase  127    AST (SGOT)  27    ALT (SGPT)  44                   Assessment and Plan     Diagnoses and all orders for this visit:    1. Acquired hypothyroidism (Primary)  -     TSH  -     T4, Free  -     T3, Free    2. Overweight (BMI 25.0-29.9)  -     Ambulatory Referral to Nutrition Services             Follow Up     Return in about 1 year (around 4/22/2025).    Labs today  Will adjust above regimen as needed based on results     Patient was given instructions and counseling regarding her condition or for health maintenance advice. Please see specific information pulled into the AVS if appropriate.     Johanna Veliz, APRN

## 2024-04-23 LAB
T3FREE SERPL-MCNC: 3.7 PG/ML (ref 2–4.4)
T4 FREE SERPL-MCNC: 1.41 NG/DL (ref 0.93–1.7)
TSH SERPL DL<=0.005 MIU/L-ACNC: 0.36 UIU/ML (ref 0.27–4.2)

## 2024-04-29 ENCOUNTER — TELEPHONE (OUTPATIENT)
Dept: ENDOCRINOLOGY | Age: 54
End: 2024-04-29
Payer: COMMERCIAL

## 2024-04-29 RX ORDER — LEVOTHYROXINE SODIUM 75 UG/1
75 TABLET ORAL DAILY
Qty: 90 TABLET | Refills: 2 | Status: SHIPPED | OUTPATIENT
Start: 2024-04-29

## 2024-04-29 NOTE — TELEPHONE ENCOUNTER
Rx Refill Note  Requested Prescriptions      No prescriptions requested or ordered in this encounter      Last office visit with prescribing clinician: 4/22/2024   Last telemedicine visit with prescribing clinician: Visit date not found   Next office visit with prescribing clinician: 4/21/2025                         Would you like a call back once the refill request has been completed: [] Yes [] No    If the office needs to give you a call back, can they leave a voicemail: [] Yes [] No    Ya Solis MA  04/29/24, 08:36 EDT

## 2024-04-29 NOTE — TELEPHONE ENCOUNTER
Rx Refill Note  Requested Prescriptions     Pending Prescriptions Disp Refills    Unithroid 75 MCG tablet [Pharmacy Med Name: UNITHROID 75 MCG TABLET] 30 tablet 0     Sig: TAKE 1 TABLET BY MOUTH DAILY      Last office visit with prescribing clinician: 4/22/2024   Last telemedicine visit with prescribing clinician: Visit date not found   Next office visit with prescribing clinician: 4/29/2024                         Would you like a call back once the refill request has been completed: [] Yes [] No    If the office needs to give you a call back, can they leave a voicemail: [] Yes [] No    Ya Solis MA  04/29/24, 09:05 EDT

## 2024-05-20 ENCOUNTER — LAB (OUTPATIENT)
Dept: OBSTETRICS AND GYNECOLOGY | Facility: CLINIC | Age: 54
End: 2024-05-20
Payer: COMMERCIAL

## 2024-05-20 DIAGNOSIS — Z79.890 HORMONE REPLACEMENT THERAPY (HRT): Primary | ICD-10-CM

## 2024-05-20 LAB
ALBUMIN SERPL-MCNC: 4.5 G/DL (ref 3.5–5.2)
ALBUMIN/GLOB SERPL: 2.3 G/DL
ALP SERPL-CCNC: 130 U/L (ref 39–117)
ALT SERPL-CCNC: 40 U/L (ref 1–33)
AST SERPL-CCNC: 27 U/L (ref 1–32)
BILIRUB SERPL-MCNC: 0.6 MG/DL (ref 0–1.2)
BUN SERPL-MCNC: 14 MG/DL (ref 6–20)
BUN/CREAT SERPL: 20.9 (ref 7–25)
CALCIUM SERPL-MCNC: 9.4 MG/DL (ref 8.6–10.5)
CHLORIDE SERPL-SCNC: 104 MMOL/L (ref 98–107)
CO2 SERPL-SCNC: 23.8 MMOL/L (ref 22–29)
CREAT SERPL-MCNC: 0.67 MG/DL (ref 0.57–1)
EGFRCR SERPLBLD CKD-EPI 2021: 103.4 ML/MIN/1.73
GLOBULIN SER CALC-MCNC: 2 GM/DL
GLUCOSE SERPL-MCNC: 114 MG/DL (ref 65–99)
POTASSIUM SERPL-SCNC: 4.5 MMOL/L (ref 3.5–5.2)
PROT SERPL-MCNC: 6.5 G/DL (ref 6–8.5)
SODIUM SERPL-SCNC: 140 MMOL/L (ref 136–145)

## 2025-01-27 RX ORDER — LEVOTHYROXINE SODIUM 75 UG/1
75 TABLET ORAL DAILY
Qty: 30 TABLET | Refills: 0 | Status: SHIPPED | OUTPATIENT
Start: 2025-01-27

## 2025-01-27 NOTE — TELEPHONE ENCOUNTER
Rx Refill Note  Requested Prescriptions     Pending Prescriptions Disp Refills    Unithroid 75 MCG tablet [Pharmacy Med Name: UNITHROID 75 MCG TABLET] 30 tablet 1     Sig: TAKE 1 TABLET BY MOUTH DAILY      Last office visit with prescribing clinician: 4/22/2024   Last telemedicine visit with prescribing clinician: Visit date not found   Next office visit with prescribing clinician: Visit date not found                         Would you like a call back once the refill request has been completed: [] Yes [] No    If the office needs to give you a call back, can they leave a voicemail: [] Yes [] No    Flor Garcia MA  01/27/25, 08:14 EST

## 2025-02-04 RX ORDER — LEVOTHYROXINE SODIUM 75 UG/1
75 TABLET ORAL DAILY
Qty: 90 TABLET | Refills: 0 | Status: SHIPPED | OUTPATIENT
Start: 2025-02-04

## 2025-02-04 NOTE — TELEPHONE ENCOUNTER
Rx Refill Note  Requested Prescriptions     Pending Prescriptions Disp Refills    Unithroid 75 MCG tablet 30 tablet 0     Sig: Take 1 tablet by mouth Daily.      Last office visit with prescribing clinician: 4/22/2024   Last telemedicine visit with prescribing clinician: Visit date not found   Next office visit with prescribing clinician: Visit date not found                         Would you like a call back once the refill request has been completed: [] Yes [] No    If the office needs to give you a call back, can they leave a voicemail: [] Yes [] No    Ya Solis MA  02/04/25, 14:35 EST

## 2025-03-07 ENCOUNTER — TELEPHONE (OUTPATIENT)
Dept: GASTROENTEROLOGY | Facility: CLINIC | Age: 55
End: 2025-03-07
Payer: COMMERCIAL

## 2025-03-31 ENCOUNTER — HOSPITAL ENCOUNTER (OUTPATIENT)
Dept: MAMMOGRAPHY | Facility: HOSPITAL | Age: 55
Discharge: HOME | End: 2025-03-31
Admitting: OBSTETRICS & GYNECOLOGY
Payer: COMMERCIAL

## 2025-03-31 DIAGNOSIS — Z12.31 ENCOUNTER FOR SCREENING MAMMOGRAM FOR MALIGNANT NEOPLASM OF BREAST: ICD-10-CM

## 2025-03-31 PROCEDURE — 77063 BREAST TOMOSYNTHESIS BI: CPT

## 2025-03-31 PROCEDURE — 77067 SCR MAMMO BI INCL CAD: CPT

## 2025-04-21 ENCOUNTER — OFFICE VISIT (OUTPATIENT)
Dept: OBSTETRICS AND GYNECOLOGY | Facility: CLINIC | Age: 55
End: 2025-04-21
Payer: COMMERCIAL

## 2025-04-21 VITALS
SYSTOLIC BLOOD PRESSURE: 148 MMHG | BODY MASS INDEX: 27.38 KG/M2 | WEIGHT: 145 LBS | HEIGHT: 61 IN | DIASTOLIC BLOOD PRESSURE: 90 MMHG

## 2025-04-21 DIAGNOSIS — Z12.31 ENCOUNTER FOR SCREENING MAMMOGRAM FOR MALIGNANT NEOPLASM OF BREAST: ICD-10-CM

## 2025-04-21 DIAGNOSIS — Z01.419 ROUTINE GYNECOLOGICAL EXAMINATION: Primary | ICD-10-CM

## 2025-04-21 DIAGNOSIS — N95.2 VAGINAL ATROPHY: ICD-10-CM

## 2025-04-21 DIAGNOSIS — Z13.820 SCREENING FOR OSTEOPOROSIS: ICD-10-CM

## 2025-04-21 LAB
BILIRUB BLD-MCNC: NEGATIVE MG/DL
CLARITY, POC: CLEAR
COLOR UR: YELLOW
GLUCOSE UR STRIP-MCNC: NEGATIVE MG/DL
KETONES UR QL: NEGATIVE
LEUKOCYTE EST, POC: NEGATIVE
NITRITE UR-MCNC: NEGATIVE MG/ML
PH UR: 5 [PH] (ref 5–8)
PROT UR STRIP-MCNC: ABNORMAL MG/DL
RBC # UR STRIP: NEGATIVE /UL
SP GR UR: 1.01 (ref 1–1.03)
UROBILINOGEN UR QL: NORMAL

## 2025-04-21 PROCEDURE — 99396 PREV VISIT EST AGE 40-64: CPT | Performed by: OBSTETRICS & GYNECOLOGY

## 2025-04-21 PROCEDURE — 81002 URINALYSIS NONAUTO W/O SCOPE: CPT | Performed by: OBSTETRICS & GYNECOLOGY

## 2025-04-21 RX ORDER — KETOCONAZOLE 20 MG/ML
SHAMPOO, SUSPENSION TOPICAL
COMMUNITY
Start: 2024-11-17

## 2025-04-21 RX ORDER — ESTRADIOL 0.1 MG/G
1 CREAM VAGINAL 2 TIMES WEEKLY
Qty: 43 G | Refills: 6 | Status: SHIPPED | OUTPATIENT
Start: 2025-04-21

## 2025-04-21 RX ORDER — CHOLECALCIFEROL (VITAMIN D3) 25 MCG
1000 TABLET ORAL DAILY
COMMUNITY

## 2025-04-21 RX ORDER — TERBINAFINE HYDROCHLORIDE 250 MG/1
1 TABLET ORAL DAILY
COMMUNITY
Start: 2025-01-09 | End: 2025-04-21

## 2025-04-21 NOTE — PROGRESS NOTES
GYN Annual Exam     CC- Here for annual exam.     Thea Pope is a 54 y.o. female established patient who presents for annual well woman exam. NO VB.  She has some HF but does not want any treatment at this time. She had cough and yawning with Lexapro, elevated LFTS so could not take Veozah and tried the patch but did not like it. She is happy with vaginal estrogen. They just got back from [place] and [place].     OB History          2    Para   2    Term   2            AB        Living   2         SAB        IAB        Ectopic        Molar        Multiple        Live Births              Obstetric Comments   2 C/S               Menarche:10  Menopause:s/p TLH with OC   HRT:none  Current contraception: status post hysterectomy  History of abnormal Pap smear: no  History of abnormal mammogram: no  Family history of uterine, colon or ovarian cancer: ? MGM colon cancer  Family history of breast cancer: no  STD's: none  Last pap smear: 2024 normal pap/neg HPV  MOOKIE: none      Health Maintenance   Topic Date Due   • Pneumococcal Vaccine 50+ (1 of 1 - PCV) Never done   • ZOSTER VACCINE (1 of 2) Never done   • ANNUAL PHYSICAL  04/10/2024   • COVID-19 Vaccine ( season) 2024   • Annual Gynecologic Pelvic and Breast Exam  2025   • INFLUENZA VACCINE  2025   • MAMMOGRAM  2027   • COLORECTAL CANCER SCREENING  10/31/2027   • TDAP/TD VACCINES (2 - Td or Tdap) 2030   • HEPATITIS C SCREENING  Completed       Past Medical History:   Diagnosis Date   • Acute upper respiratory infection, unspecified    • Allergic     seasonal   • Candidiasis     OF OTHER UROGENITAL SITES   • Colon polyp    • Cough    • Deficiency of other specified B group vitamins    • Gestational diabetes    • Hypothyroidism    • Pain in limb    • Pain in unspecified joint    • Plantar fasciitis    • Unspecified acute conjunctivitis, left eye    • Unspecified menopausal and perimenopausal disorder    • Vitamin  B12 deficiency 2021   • Vitamin D deficiency 2021   • Vitamin D deficiency, unspecified        Past Surgical History:   Procedure Laterality Date   •  SECTION      x 2   • COLONOSCOPY      2019, POLYPECTOMY WITH PRECANCEROUS CELLS, REPEAT Q3Y, DUE    • COLONOSCOPY W/ POLYPECTOMY N/A 10/31/2022    Procedure: COLONOSCOPY WITH POLYPECTOMY;  Surgeon: Vlad Elkins MD;  Location: [facility];  Service: Gastroenterology;  Laterality: N/A;  polyps   • HYSTERECTOMY      TLH with OC   • PELVIC LAPAROSCOPY  2014    dx lsc, removal of adhesion from cuff         Current Outpatient Medications:   •  cholecalciferol (Vitamin D, Cholecalciferol,) 25 MCG (1000 UT) tablet, Take 1 tablet by mouth Daily., Disp: , Rfl:   •  estradiol (ESTRACE) 0.1 MG/GM vaginal cream, Insert 1 g into the vagina 2 (Two) Times a Week., Disp: 43 g, Rfl: 6  •  ketoconazole (NIZORAL) 2 % shampoo, , Disp: , Rfl:   •  Unithroid 75 MCG tablet, Take 1 tablet by mouth Daily., Disp: 90 tablet, Rfl: 0    Allergies   Allergen Reactions   • Gramineae Pollens Unknown - Low Severity       Social History     Tobacco Use   • Smoking status: Never   • Smokeless tobacco: Never   Vaping Use   • Vaping status: Never Used   Substance Use Topics   • Alcohol use: Yes   • Drug use: No       Family History   Problem Relation Age of Onset   • Hyperlipidemia Father    • Diabetes Mother         PREDIABETES   • Acromegaly Mother    • Colon cancer Maternal Grandmother    • Breast cancer Neg Hx    • Ovarian cancer Neg Hx    • Deep vein thrombosis Neg Hx    • Pulmonary embolism Neg Hx    • Uterine cancer Neg Hx        Review of Systems   Constitutional:  Negative for activity change, appetite change, fatigue, fever and unexpected weight change.   Respiratory:  Negative for cough and shortness of breath.    Cardiovascular:  Negative for chest pain and palpitations.   Gastrointestinal:  Negative for abdominal distention, abdominal pain,  "constipation, diarrhea and nausea.   Endocrine: Negative for cold intolerance. Heat intolerance: mild.  Genitourinary:  Negative for dyspareunia, dysuria, menstrual problem, pelvic pain, vaginal bleeding, vaginal discharge and vaginal pain.   Skin:  Negative for color change and rash.   Neurological:  Negative for headaches.   Psychiatric/Behavioral:  Negative for dysphoric mood and sleep disturbance. The patient is not nervous/anxious.        /90   Ht 154.9 cm (60.98\")   Wt 65.8 kg (145 lb)   BMI 27.42 kg/m²     Physical Exam   Constitutional: She is oriented to person, place, and time. She appears well-developed.   HENT:   Head: Normocephalic and atraumatic.   Eyes: Conjunctivae are normal. No scleral icterus.   Neck: No thyromegaly present.   Cardiovascular: Normal rate and regular rhythm.   Pulmonary/Chest: Effort normal and breath sounds normal. Right breast exhibits no inverted nipple, no mass, no nipple discharge, no skin change and no tenderness. Left breast exhibits no inverted nipple, no mass, no nipple discharge, no skin change and no tenderness.   Abdominal: Soft. Normal appearance and bowel sounds are normal. She exhibits no distension and no mass. There is no abdominal tenderness. There is no rebound and no guarding. No hernia.   Genitourinary:    Rectum normal.      Pelvic exam was performed with patient supine.   There is no rash, tenderness or lesion on the right labia. There is no rash, tenderness or lesion on the left labia. Right adnexum displays no mass, no tenderness and no fullness. Left adnexum displays no mass, no tenderness and no fullness.    No vaginal discharge, erythema, tenderness or bleeding.   No erythema, tenderness or bleeding in the vagina.    No foreign body in the vagina.      No signs of injury in the vagina.      Genitourinary Comments: Grade 1 cystocele  Uterus and cervix absent       Neurological: She is alert and oriented to person, place, and time.   Skin: Skin is " warm and dry.   Psychiatric: Her behavior is normal. Mood, judgment and thought content normal.   Nursing note and vitals reviewed.         Assessment/Plan    1) GYN HM: normal  pap/HPV  4/2024 SBE demonstrated and encouraged.  2) STD screening: declines Condoms encouraged.  3) Bone health - Weight bearing exercise, dietary calcium recommendations and vitamin D reviewed.   4) Diet and Exercise discussed  5) Smoking Status: No   6) Hot flashes and night sweats- pt declines treatment at this time  7)MMG:  UTD 3/2025- B1, schedule MMG 3/2026  8) DEXA- UTD 11/2022- normal BMD. Repeat in 3/2026  9)C scope-UTD 11/2022, repeat 5 years per path report  10) Grade 1 cystocele- pt asymptomatic- Kegel's encrouaged  11) Vaginal atrophy - improved with Estrace. Patient counseled that vaginal estrogen rings, creams and tablets are available and highly effective at treating local vaginal symptoms such as atrophy and vaginal dryness.  Vaginal estrogen does not cause uterine overgrowth and does not require a progestogen to protect the uterus.  Very small amounts of estrogen are absorbed systemically.  For patients with a history of an estrogen dependent cancer such as breast cancer, the decision to use local estrogen for local vaginal symptoms should be made after consultation with her oncologist.  Possible side effects include local irritation or burning and/or vaginal bleeding and should always be reported.  Will call for refills when needed.   12)Parts of this document have been copied or forwarded from her previous visits and have been reviewed, updated and edited as indicated. .   13) Follow up prn and 1 year annual          Diagnoses and all orders for this visit:    1. Routine gynecological examination (Primary)  -     POC Urinalysis Dipstick    2. Screening for osteoporosis  -     DEXA Bone Density Axial; Future    3. Encounter for screening mammogram for malignant neoplasm of breast  -     Mammo Screening Digital  Tomosynthesis Bilateral With CAD; Future    4. Vaginal atrophy    Other orders  -     estradiol (ESTRACE) 0.1 MG/GM vaginal cream; Insert 1 g into the vagina 2 (Two) Times a Week.  Dispense: 43 g; Refill: 6        Monica Jones MD  4/21/2025  12:31 EDT

## 2025-04-28 ENCOUNTER — OFFICE VISIT (OUTPATIENT)
Dept: ENDOCRINOLOGY | Age: 55
End: 2025-04-28
Payer: COMMERCIAL

## 2025-04-28 VITALS
HEART RATE: 89 BPM | OXYGEN SATURATION: 97 % | HEIGHT: 61 IN | SYSTOLIC BLOOD PRESSURE: 118 MMHG | DIASTOLIC BLOOD PRESSURE: 78 MMHG | WEIGHT: 149.4 LBS | BODY MASS INDEX: 28.21 KG/M2

## 2025-04-28 DIAGNOSIS — E03.9 ACQUIRED HYPOTHYROIDISM: Primary | ICD-10-CM

## 2025-04-28 DIAGNOSIS — E66.3 OVERWEIGHT (BMI 25.0-29.9): ICD-10-CM

## 2025-04-28 PROCEDURE — 99214 OFFICE O/P EST MOD 30 MIN: CPT | Performed by: INTERNAL MEDICINE

## 2025-04-28 NOTE — PROGRESS NOTES
Referring provider: No ref. provider found     Chief complaint/Reason for consult: hypothyroidism    HPI:   - 54 year old female here for hypothyroidism  - Is currently on Unithroid 75 mcg daily  - Denies missing any does of her levothyroxine  - States she has difficulty losing weight    The following portions of the patient's history were reviewed and updated as appropriate: allergies, current medications, past family history, past medical history, past social history, past surgical history, and problem list.      Objective     Vitals:    04/28/25 1529   BP: 118/78   Pulse: 89   SpO2: 97%        Physical Exam  Vitals reviewed.   Constitutional:       Appearance: Normal appearance.   HENT:      Head: Normocephalic and atraumatic.   Eyes:      General: No scleral icterus.  Pulmonary:      Effort: Pulmonary effort is normal. No respiratory distress.   Neurological:      Mental Status: She is alert.      Gait: Gait normal.   Psychiatric:         Mood and Affect: Mood normal.         Behavior: Behavior normal.         Thought Content: Thought content normal.         Judgment: Judgment normal.         Assessment & Plan   Hypothyroidism  - Will check TSH and free T4  - On Unithroid 75 mcg daily    2. Overweight  - Will check A1c and CMP    - Return to clinic in 1 year

## 2025-04-29 LAB
ALBUMIN SERPL-MCNC: 4.3 G/DL (ref 3.5–5.2)
ALBUMIN/GLOB SERPL: 2.2 G/DL
ALP SERPL-CCNC: 110 U/L (ref 39–117)
ALT SERPL-CCNC: 19 U/L (ref 1–33)
AST SERPL-CCNC: 19 U/L (ref 1–32)
BILIRUB SERPL-MCNC: 0.4 MG/DL (ref 0–1.2)
BUN SERPL-MCNC: 15 MG/DL (ref 6–20)
BUN/CREAT SERPL: 22.1 (ref 7–25)
CALCIUM SERPL-MCNC: 8.9 MG/DL (ref 8.6–10.5)
CHLORIDE SERPL-SCNC: 106 MMOL/L (ref 98–107)
CO2 SERPL-SCNC: 25.8 MMOL/L (ref 22–29)
CREAT SERPL-MCNC: 0.68 MG/DL (ref 0.57–1)
EGFRCR SERPLBLD CKD-EPI 2021: 102.4 ML/MIN/1.73
GLOBULIN SER CALC-MCNC: 2 GM/DL
GLUCOSE SERPL-MCNC: 122 MG/DL (ref 65–99)
HBA1C MFR BLD: 5.9 % (ref 4.8–5.6)
POTASSIUM SERPL-SCNC: 4 MMOL/L (ref 3.5–5.2)
PROT SERPL-MCNC: 6.3 G/DL (ref 6–8.5)
SODIUM SERPL-SCNC: 141 MMOL/L (ref 136–145)
T4 FREE SERPL-MCNC: 1.47 NG/DL (ref 0.92–1.68)
TSH SERPL DL<=0.005 MIU/L-ACNC: 0.6 UIU/ML (ref 0.27–4.2)

## 2025-04-30 RX ORDER — LEVOTHYROXINE SODIUM 75 UG/1
75 TABLET ORAL DAILY
Qty: 90 TABLET | Refills: 1 | Status: SHIPPED | OUTPATIENT
Start: 2025-04-30

## 2025-04-30 NOTE — TELEPHONE ENCOUNTER
Rx Refill Note  Requested Prescriptions     Pending Prescriptions Disp Refills    Unithroid 75 MCG tablet [Pharmacy Med Name: Unithroid 75mcg Tablet] 90 tablet 0     Sig: Take 1 tablet by mouth daily.      Last office visit with prescribing clinician: 4/28/2025   Last telemedicine visit with prescribing clinician: Visit date not found   Next office visit with prescribing clinician: 4/27/2026                         Would you like a call back once the refill request has been completed: [] Yes [] No    If the office needs to give you a call back, can they leave a voicemail: [] Yes [] No    Flor Garcia MA  04/30/25, 13:55 EDT

## (undated) DEVICE — THE SINGLE USE ETRAP – POLYP TRAP IS USED FOR SUCTION RETRIEVAL OF ENDOSCOPICALLY REMOVED POLYPS.: Brand: ETRAP

## (undated) DEVICE — VIAL FORMALIN CAP 10P 40ML

## (undated) DEVICE — SYR LL 3CC

## (undated) DEVICE — KT ORCA ORCAPOD DISP STRL

## (undated) DEVICE — SAFELINER SUCTION CANISTER 1000CC: Brand: DEROYAL

## (undated) DEVICE — BW-412T DISP COMBO CLEANING BRUSH: Brand: SINGLE USE COMBINATION CLEANING BRUSH

## (undated) DEVICE — GLV SURG SENSICARE PI MIC PF SZ7.5 LF STRL

## (undated) DEVICE — SNAR POLYP CAPTIFLX MICRO OVL 13MM 240CM

## (undated) DEVICE — JACKT LAB F/R KNIT CUFF/COLR XLG BLU

## (undated) DEVICE — ADAPT CLN BIOGUARD AIR/H2O DISP

## (undated) DEVICE — Device